# Patient Record
Sex: FEMALE | Race: WHITE | NOT HISPANIC OR LATINO | URBAN - METROPOLITAN AREA
[De-identification: names, ages, dates, MRNs, and addresses within clinical notes are randomized per-mention and may not be internally consistent; named-entity substitution may affect disease eponyms.]

---

## 2022-08-24 DIAGNOSIS — N93.9 ABNORMAL VAGINAL BLEEDING: ICD-10-CM

## 2022-08-24 DIAGNOSIS — D25.9 LEIOMYOMA OF UTERUS, UNSPECIFIED: Primary | ICD-10-CM

## 2022-08-26 DIAGNOSIS — E10.65 TYPE 1 DIABETES MELLITUS WITH HYPERGLYCEMIA: Primary | ICD-10-CM

## 2023-08-28 ENCOUNTER — OFFICE VISIT (OUTPATIENT)
Dept: OPHTHALMOLOGY | Facility: CLINIC | Age: 38
End: 2023-08-28
Payer: MEDICARE

## 2023-08-28 VITALS — WEIGHT: 197 LBS

## 2023-08-28 DIAGNOSIS — H40.022 AT HIGH RISK FOR OPEN ANGLE GLAUCOMA OF LEFT EYE: ICD-10-CM

## 2023-08-28 DIAGNOSIS — H47.239 LARGE PHYSIOLOGIC CUPPING OF OPTIC DISC: ICD-10-CM

## 2023-08-28 DIAGNOSIS — H43.11 VITREOUS HEMORRHAGE, RIGHT: ICD-10-CM

## 2023-08-28 DIAGNOSIS — H25.13 AGE-RELATED NUCLEAR CATARACT OF BOTH EYES: ICD-10-CM

## 2023-08-28 DIAGNOSIS — E11.3521 RIGHT EYE AFFECTED BY PROLIFERATIVE DIABETIC RETINOPATHY WITH TRACTION RETINAL DETACHMENT INVOLVING MACULA, ASSOCIATED WITH TYPE 2 DIABETES MELLITUS: Primary | ICD-10-CM

## 2023-08-28 DIAGNOSIS — E11.3592 PROLIFERATIVE DIABETIC RETINOPATHY OF LEFT EYE WITHOUT MACULAR EDEMA ASSOCIATED WITH TYPE 2 DIABETES MELLITUS: ICD-10-CM

## 2023-08-28 PROCEDURE — 92133 CPTRZD OPH DX IMG PST SGM ON: CPT | Mod: PBBFAC,PN | Performed by: STUDENT IN AN ORGANIZED HEALTH CARE EDUCATION/TRAINING PROGRAM

## 2023-08-28 PROCEDURE — 92134 CPTRZ OPH DX IMG PST SGM RTA: CPT | Mod: PBBFAC,PO | Performed by: OPHTHALMOLOGY

## 2023-08-28 PROCEDURE — 92133 CPTRZD OPH DX IMG PST SGM ON: CPT | Mod: PBBFAC,PO | Performed by: OPHTHALMOLOGY

## 2023-08-28 PROCEDURE — 92134 CPTRZ OPH DX IMG PST SGM RTA: CPT | Mod: PBBFAC,PO | Performed by: STUDENT IN AN ORGANIZED HEALTH CARE EDUCATION/TRAINING PROGRAM

## 2023-08-28 PROCEDURE — 99213 OFFICE O/P EST LOW 20 MIN: CPT | Mod: PBBFAC,PO | Performed by: STUDENT IN AN ORGANIZED HEALTH CARE EDUCATION/TRAINING PROGRAM

## 2023-08-28 PROCEDURE — 92250 FUNDUS PHOTOGRAPHY W/I&R: CPT | Mod: PBBFAC,PO | Performed by: OPHTHALMOLOGY

## 2023-08-28 RX ORDER — INSULIN GLARGINE 100 [IU]/ML
INJECTION, SOLUTION SUBCUTANEOUS
COMMUNITY
Start: 2023-08-06

## 2023-08-28 RX ORDER — NAPROXEN SODIUM 220 MG/1
81 TABLET, FILM COATED ORAL EVERY MORNING
COMMUNITY
Start: 2023-07-12

## 2023-08-28 RX ORDER — SUCROFERRIC OXYHYDROXIDE 500 MG/1
2 TABLET, CHEWABLE ORAL 3 TIMES DAILY
COMMUNITY
Start: 2023-08-08

## 2023-08-28 RX ORDER — ATORVASTATIN CALCIUM 80 MG/1
80 TABLET, FILM COATED ORAL
COMMUNITY
Start: 2023-07-11

## 2023-08-28 RX ORDER — METOCLOPRAMIDE 10 MG/1
10 TABLET ORAL
COMMUNITY

## 2023-08-28 RX ORDER — MEDROXYPROGESTERONE ACETATE 150 MG/ML
INJECTION, SUSPENSION INTRAMUSCULAR
COMMUNITY
Start: 2023-08-16

## 2023-08-28 RX ORDER — INSULIN LISPRO 100 [IU]/ML
INJECTION, SOLUTION INTRAVENOUS; SUBCUTANEOUS
COMMUNITY
Start: 2023-03-28

## 2023-08-28 RX ORDER — ERGOCALCIFEROL 1.25 MG/1
50000 CAPSULE ORAL
COMMUNITY
Start: 2022-10-19

## 2023-08-28 RX ORDER — IRBESARTAN 150 MG/1
150 TABLET ORAL
COMMUNITY
Start: 2021-05-10

## 2023-08-28 RX ORDER — CALCIUM CITRATE/VITAMIN D3 200MG-6.25
TABLET ORAL 3 TIMES DAILY
COMMUNITY
Start: 2023-06-18

## 2023-08-28 RX ORDER — PANTOPRAZOLE SODIUM 40 MG/1
40 TABLET, DELAYED RELEASE ORAL
COMMUNITY
Start: 2023-07-11

## 2023-08-28 RX ORDER — TRAMADOL HYDROCHLORIDE 50 MG/1
50 TABLET ORAL EVERY 6 HOURS PRN
COMMUNITY
Start: 2023-07-21

## 2023-08-28 RX ORDER — TRAZODONE HYDROCHLORIDE 50 MG/1
50 TABLET ORAL NIGHTLY PRN
COMMUNITY
Start: 2023-08-08

## 2023-08-28 RX ORDER — PEN NEEDLE, DIABETIC 31 GX5/16"
NEEDLE, DISPOSABLE MISCELLANEOUS 2 TIMES DAILY
COMMUNITY
Start: 2023-08-06

## 2023-08-28 RX ORDER — PROMETHAZINE HYDROCHLORIDE AND DEXTROMETHORPHAN HYDROBROMIDE 6.25; 15 MG/5ML; MG/5ML
5 SYRUP ORAL EVERY 6 HOURS PRN
COMMUNITY
Start: 2023-04-12

## 2023-08-28 RX ORDER — FUROSEMIDE 80 MG/1
80 TABLET ORAL EVERY MORNING
COMMUNITY
Start: 2023-06-12

## 2023-08-28 RX ORDER — SODIUM BICARBONATE 650 MG/1
1950 TABLET ORAL
COMMUNITY
Start: 2022-10-19

## 2023-08-28 RX ORDER — LORATADINE 10 MG/1
10 TABLET ORAL DAILY PRN
COMMUNITY
Start: 2023-04-11

## 2023-08-28 RX ORDER — CALCITRIOL 0.25 UG/1
0.25 CAPSULE ORAL
COMMUNITY
Start: 2022-09-08

## 2023-08-28 RX ORDER — INSULIN ASPART 100 [IU]/ML
INJECTION, SOLUTION INTRAVENOUS; SUBCUTANEOUS
COMMUNITY
Start: 2023-07-22

## 2023-08-28 RX ORDER — AMLODIPINE BESYLATE 10 MG/1
10 TABLET ORAL
COMMUNITY
Start: 2023-06-16

## 2023-08-28 RX ORDER — LANCETS 33 GAUGE
EACH MISCELLANEOUS
COMMUNITY
Start: 2023-06-20

## 2023-08-28 RX ORDER — SYRINGE,SAFETY WITH NEEDLE,1ML 25GX1"
1 SYRINGE (EA) MISCELLANEOUS 4 TIMES DAILY
COMMUNITY
Start: 2023-08-12

## 2023-08-28 RX ORDER — SEVELAMER CARBONATE 800 MG/1
1600 TABLET, FILM COATED ORAL 3 TIMES DAILY
COMMUNITY
Start: 2023-07-13

## 2023-08-28 RX ORDER — CARVEDILOL 25 MG/1
25 TABLET ORAL 2 TIMES DAILY
COMMUNITY
Start: 2023-05-15

## 2023-08-28 NOTE — PROGRESS NOTES
HPI    ED follow up from 8/27/2023   Patient has been on DM medication for about 20 years but has recently had   to change medications due to insurance issues and due to that her blood   sugar is up and down    She  has been  experiencing eye pressure for many years in both eyes, she   is LP in the right eye with a grey mass in her vision sight   Her eye glasses are about 3 years old   Last edited by Miriam Erazo MA on 8/28/2023 10:15 AM.            OCTmac 8/28/23  OD: TRD, macula involving  OS: Ischemic appearance, no irf/srf, good FC    OCTnerve: 8/28/23  OD: no signal  OS: 87, S yellow, T white, N/I green    Fundus photos 8/28/23  OD: TRD, macula involving  OS: hazy, MA, DBH, no NVD     A/P:    TRD R eye  2. PDR, active, R eye  3. VH R eye  - poorly controlled DM2, on HD d/t DM2 related CKD, felt right eye vision decline around 07/2023 with flashes/floaters  - A1c unknown  - No lasers  - no injections  8/28/23: mac off TRD, d/w pt need to see [facility] if desires to improve vision, patient will be able to arrange a ride there. Will place referral and contact clinic for f/u. RTC Mikey post [facility].      4. ?PDR, active, with DME L eye  - no lasers  - no injections  8/28/23: ? NVE focused temporally distal arcades, no DME. Will likely need PRP after [facility] eval R eye    5. Monocular status  - polycarbs, Mrx  given 828/23    6. NSC OU  - likely nearing VS, however will delay tx until acute issues stabilized    7. Optic disc cupping OS>OD  - OCTn with ?thinning OS  - IOP WNL  - Monitor       Refer to [facility], f/u PRN in [place]

## 2023-08-29 PROBLEM — E11.3592 PROLIFERATIVE DIABETIC RETINOPATHY OF LEFT EYE WITHOUT MACULAR EDEMA ASSOCIATED WITH TYPE 2 DIABETES MELLITUS: Status: ACTIVE | Noted: 2023-08-29

## 2023-09-15 ENCOUNTER — TELEPHONE (OUTPATIENT)
Dept: OPHTHALMOLOGY | Facility: CLINIC | Age: 38
End: 2023-09-15
Payer: MEDICARE

## 2023-09-15 ENCOUNTER — OFFICE VISIT (OUTPATIENT)
Dept: OPHTHALMOLOGY | Facility: CLINIC | Age: 38
End: 2023-09-15
Payer: MEDICARE

## 2023-09-15 VITALS — BODY MASS INDEX: 36.26 KG/M2 | HEIGHT: 62 IN | WEIGHT: 197.06 LBS

## 2023-09-15 DIAGNOSIS — E11.3592 PROLIFERATIVE DIABETIC RETINOPATHY OF LEFT EYE WITHOUT MACULAR EDEMA ASSOCIATED WITH TYPE 2 DIABETES MELLITUS: Primary | ICD-10-CM

## 2023-09-15 DIAGNOSIS — H25.13 AGE-RELATED NUCLEAR CATARACT OF BOTH EYES: ICD-10-CM

## 2023-09-15 DIAGNOSIS — H43.11 VITREOUS HEMORRHAGE, RIGHT: ICD-10-CM

## 2023-09-15 DIAGNOSIS — E10.3512 PROLIFERATIVE DIABETIC RETINOPATHY OF LEFT EYE WITH MACULAR EDEMA ASSOCIATED WITH TYPE 1 DIABETES MELLITUS: ICD-10-CM

## 2023-09-15 DIAGNOSIS — H43.12 VITREOUS HEMORRHAGE, LEFT: ICD-10-CM

## 2023-09-15 PROCEDURE — 99213 OFFICE O/P EST LOW 20 MIN: CPT | Mod: PBBFAC,PN | Performed by: STUDENT IN AN ORGANIZED HEALTH CARE EDUCATION/TRAINING PROGRAM

## 2023-09-15 PROCEDURE — 92134 CPTRZ OPH DX IMG PST SGM RTA: CPT | Mod: PBBFAC,PO | Performed by: STUDENT IN AN ORGANIZED HEALTH CARE EDUCATION/TRAINING PROGRAM

## 2023-09-15 PROCEDURE — 92250 FUNDUS PHOTOGRAPHY W/I&R: CPT | Mod: PBBFAC,PO | Performed by: OPHTHALMOLOGY

## 2023-09-15 RX ORDER — PHENYLEPH/TROPICAMIDE IN WATER 2.5 %-1 %
1 DROPS OPHTHALMIC (EYE) ONCE
Status: COMPLETED | OUTPATIENT
Start: 2023-09-15 | End: 2023-09-15

## 2023-09-15 RX ADMIN — Medication 1 DROP: at 12:09

## 2023-09-15 NOTE — TELEPHONE ENCOUNTER
Called and spoke to pt who states vision OS is very blurry suddenly. States all she sees is red. Can't read or see anything up close. Per Dr. South, pt to be seen today. Added on to schedule. Pt coming now.

## 2023-09-15 NOTE — PROGRESS NOTES
HPI     Decreased Visual Acuity     Additional comments: Patient states that when she woke up this morning   she saw red blob in her vision OD and vision OD was decreased            Comments    Decreased Visual Acuity          Last edited by Princess Jama MA on 9/15/2023 12:15 PM.            Assessment /Plan       HPI     Decreased Visual Acuity     Additional comments: Patient states that when she woke up this morning   she saw red blob in her vision OD and vision OD was decreased            Comments    Decreased Visual Acuity          Last edited by Princess Jama MA on 9/15/2023 12:15 PM.        OCTmac 9/15/23  OD: TRD, mac off  OS: dense fresh hyperechoic material subhyaloid, macula beneath appears flat    OCTmac 8/28/23  OD: TRD, macula involving  OS: Ischemic appearance, no irf/srf, good FC    OCTnerve: 8/28/23  OD: no signal  OS: 87, S yellow, T white, N/I green    Fundus photos 8/28/23  OD: TRD, macula involving  OS: hazy, MA, DBH, no NVD     A/P:    TRD/RRD R eye  2. T1DM w/PDR, active, R eye  3. VH R eye  - poorly controlled DM2, on HD d/t DM2 related CKD, felt right eye vision decline around 07/2023 with flashes/floaters  - A1c unknown  - No lasers  - no injections  8/28/23: mac off TRD, d/w pt need to see [facility] if desires to improve vision, patient will be able to arrange a ride there  9/15/23: pt scheduled for TRD repair in [place] in October    4. T1DM w/PDR, active, with DME L eye  5. Subhylaoid bleed/VH L eye  - no lasers  - no injections  8/28/23: ? NVE focused temporally distal arcades, no DME. Will likely need PRP after [facility] eval R eye  -9/15/23: pt with acute drop in left eye vision 9/14/23 seeing red floaters, fresh subhyaloid heme on DFE. NO traction. Photos taken.  D/w Dr Medeiros [facility] fellow who suggests PRP followed by [facility] in same day given concern for hazy K and IOP spike. Still desires for right eye TRD surgery first. Will RTC next avail procedure for this plan.     5. Monocular  status  - polycarbs, Mrx  given 828/23    6. NSC OU  - likely nearing VS, however will delay tx until acute issues stabilized    7. Optic disc cupping OS>OD  - OCTn with ?thinning OS  - IOP WNL  - Monitor       Refer to [facility] 10/5 R eye  RTC Eric 1-2 weeks PRP OS followed by [facility] OS same day   Will need glaucoma evaluation

## 2023-09-15 NOTE — TELEPHONE ENCOUNTER
----- Message from Gail Watson sent at 9/15/2023  9:04 AM CDT -----  Regarding: Sudden vision change  Contact: Pamela  Patient called to say her left eye vision has changed,everything is blurry.  Requesting to speak with provider to see what to she needs to do.     Confirmed no drops being used    Please give her a call. [phone]      Thanks

## 2023-09-25 ENCOUNTER — TELEPHONE (OUTPATIENT)
Dept: OPHTHALMOLOGY | Facility: CLINIC | Age: 38
End: 2023-09-25
Payer: MEDICARE

## 2023-09-25 NOTE — TELEPHONE ENCOUNTER
Pre-procedure call. Spoke with pt who has no questions regarding upcoming procedure. Reminded of appt time. Pt confirms and states will be present. Instructed pt to call if unable to come.

## 2023-09-27 ENCOUNTER — CLINICAL SUPPORT (OUTPATIENT)
Dept: OPHTHALMOLOGY | Facility: CLINIC | Age: 38
End: 2023-09-27
Payer: MEDICARE

## 2023-09-27 VITALS — WEIGHT: 197.06 LBS | HEIGHT: 62 IN | BODY MASS INDEX: 36.26 KG/M2

## 2023-09-27 DIAGNOSIS — E10.3512 PROLIFERATIVE DIABETIC RETINOPATHY OF LEFT EYE WITH MACULAR EDEMA ASSOCIATED WITH TYPE 1 DIABETES MELLITUS: ICD-10-CM

## 2023-09-27 DIAGNOSIS — E11.3521 RIGHT EYE AFFECTED BY PROLIFERATIVE DIABETIC RETINOPATHY WITH TRACTION RETINAL DETACHMENT INVOLVING MACULA, ASSOCIATED WITH TYPE 2 DIABETES MELLITUS: Primary | ICD-10-CM

## 2023-09-27 PROCEDURE — 92134 CPTRZ OPH DX IMG PST SGM RTA: CPT | Mod: PBBFAC,PN | Performed by: OPHTHALMOLOGY

## 2023-09-27 PROCEDURE — 92134 CPTRZ OPH DX IMG PST SGM RTA: CPT | Mod: PBBFAC,PN | Performed by: STUDENT IN AN ORGANIZED HEALTH CARE EDUCATION/TRAINING PROGRAM

## 2023-09-27 PROCEDURE — 99212 OFFICE O/P EST SF 10 MIN: CPT | Mod: PBBFAC,PN | Performed by: STUDENT IN AN ORGANIZED HEALTH CARE EDUCATION/TRAINING PROGRAM

## 2023-09-27 PROCEDURE — 67228 TREATMENT X10SV RETINOPATHY: CPT | Mod: PBBFAC,PN | Performed by: STUDENT IN AN ORGANIZED HEALTH CARE EDUCATION/TRAINING PROGRAM

## 2023-09-27 RX ORDER — MULTIVITAMIN
1 TABLET ORAL
COMMUNITY

## 2023-09-27 NOTE — PROGRESS NOTES
HPI     Proliferative diabetic retinopathy      Additional comments: PRP OS followed by [facility] OS same day            Comments    T1DM w/PDR, active, with DME Left eye          Last edited by Princess Jama MA on 9/27/2023  9:08 AM.            HPI     Proliferative diabetic retinopathy      Additional comments: PRP OS followed by [facility] OS same day            Comments    T1DM w/PDR, active, with DME Left eye          Last edited by Princess Jama MA on 9/27/2023  9:08 AM.            Assessment /Plan       HPI     Proliferative diabetic retinopathy      Additional comments: PRP OS followed by [facility] OS same day            Comments    T1DM w/PDR, active, with DME Left eye          Last edited by Princess Jama MA on 9/27/2023  9:08 AM.        OCTmac 9/27/23  OD: large SRF, TRD  OS: central VMT with distorted FC, no irf/srf    OCTmac 9/15/23  OD: TRD, mac off  OS: dense fresh hyperechoic material subhyaloid, macula beneath appears flat    OCTmac 8/28/23  OD: TRD, macula involving  OS: Ischemic appearance, no irf/srf, good FC    OCTnerve: 8/28/23  OD: no signal  OS: 87, S yellow, T white, N/I green    Fundus photos 8/28/23  OD: TRD, macula involving  OS: hazy, MA, DBH, no NVD     A/P:    TRD/RRD R eye  2. T1DM w/PDR, active, R eye  3. VH R eye  - poorly controlled DM2, on HD d/t DM2 related CKD, felt right eye vision decline around 07/2023 with flashes/floaters  - A1c unknown  - No lasers  - no injections  8/28/23: mac off TRD, d/w pt need to see [facility] if desires to improve vision, patient will be able to arrange a ride there  9/15/23: pt scheduled for TRD repair in [facility] in October 10/5    4. T1DM w/PDR, active, with DME L eye  5. Subhylaoid bleed/VH L eye  6. VMT OS  -  #1 9/27/23: will need more  - no injections  8/28/23: ? NVE focused temporally distal arcades, no DME. Will likely need PRP after [facility] eval R eye  -9/15/23: pt with acute drop in left eye vision 9/14/23 seeing red floaters, fresh subhyaloid  heme on DFE. NO traction. Photos taken.  D/w Dr Medeiros [facility] fellow who suggests PRP followed by [facility] in same day given concern for hazy K and IOP spike. Still desires for right eye TRD surgery first.   - 9/27/23: completed , will need more fill, strong bells with only OK take and not enough shots placed. Deferred R[facility] as well on this day given improved subhyaloid blood and VA, with no high risk PDR fts. Pt to have [facility] surgery and RTC once they are ready. Will need future    5. Monocular status  - polycarbs, Mrx  given 828/23    6. NSC OU  - likely nearing VS, however will delay tx until acute issues stabilized    7. Optic disc cupping OS>OD  - OCTn with ?thinning OS  - IOP WNL  - Monitor       Refer to [facility] 10/5 R eye  RTC Olayinkafayettnat PRN Pt to have [facility] surgery and RTC once they are ready  Will need glaucoma evaluation after    PROCEDURE NOTE:  Procedure: Panretinal photocoagulation, left eye  Indication: Proliferative diabetic retinopathy, left eye  Procedure:   Risks, benefits, and alternatives were discussed.  All questions were answered.  The patient voiced understanding and wished to proceed.  Informed consent was obtained.  A time out was performed to confirm correct patient, procedure, and site.  Topical anesthesia was instilled in the eye to be treated with proparacaine.  Laser details as below:    Laser: Argon    Spot size: 200   Power: 190-200    Duration: 150   Number: [phone]  360 degrees, avoiding subhyaloid heme, strong bells, will need more fill.    Pt tolerated procedure well without complications.  Pt was given a follow up appointment and given anticipatory guidance regarding potential complications

## 2025-03-13 DIAGNOSIS — E11.9 TYPE 2 DIABETES MELLITUS WITHOUT COMPLICATION, UNSPECIFIED WHETHER LONG TERM INSULIN USE: Primary | ICD-10-CM

## 2025-07-24 ENCOUNTER — HOSPITAL ENCOUNTER (INPATIENT)
Facility: HOSPITAL | Age: 40
LOS: 2 days | Discharge: HOME | DRG: 313 | End: 2025-07-26
Attending: EMERGENCY MEDICINE | Admitting: INTERNAL MEDICINE
Payer: COMMERCIAL

## 2025-07-24 DIAGNOSIS — R07.9 CHEST PAIN: ICD-10-CM

## 2025-07-24 LAB
ALBUMIN SERPL-MCNC: 3.6 G/DL (ref 3.5–5)
ALBUMIN/GLOB SERPL: 0.7 RATIO (ref 1.1–2)
ALP SERPL-CCNC: 213 UNIT/L (ref 40–150)
ALT SERPL-CCNC: 13 UNIT/L (ref 0–55)
ANION GAP SERPL CALC-SCNC: 13 MEQ/L
AST SERPL-CCNC: 15 UNIT/L (ref 11–45)
BASOPHILS # BLD AUTO: 0.04 X10(3)/MCL
BASOPHILS NFR BLD AUTO: 0.7 %
BILIRUB SERPL-MCNC: 1 MG/DL
BUN SERPL-MCNC: 22.1 MG/DL (ref 7–18.7)
CALCIUM SERPL-MCNC: 9.2 MG/DL (ref 8.4–10.2)
CHLORIDE SERPL-SCNC: 93 MMOL/L (ref 98–107)
CO2 SERPL-SCNC: 32 MMOL/L (ref 22–29)
CREAT SERPL-MCNC: 4.29 MG/DL (ref 0.55–1.02)
CREAT/UREA NIT SERPL: 5
D DIMER PPP IA.FEU-MCNC: 0.39 UG/ML FEU (ref 0–0.5)
EOSINOPHIL # BLD AUTO: 0.11 X10(3)/MCL (ref 0–0.9)
EOSINOPHIL NFR BLD AUTO: 1.8 %
ERYTHROCYTE [DISTWIDTH] IN BLOOD BY AUTOMATED COUNT: 14.7 % (ref 11.5–17)
FLUAV AG UPPER RESP QL IA.RAPID: NOT DETECTED
FLUBV AG UPPER RESP QL IA.RAPID: NOT DETECTED
GFR SERPLBLD CREATININE-BSD FMLA CKD-EPI: 13 ML/MIN/1.73/M2
GLOBULIN SER-MCNC: 5.1 GM/DL (ref 2.4–3.5)
GLUCOSE SERPL-MCNC: 180 MG/DL (ref 74–100)
HCT VFR BLD AUTO: 34.1 % (ref 37–47)
HGB BLD-MCNC: 11.1 G/DL (ref 12–16)
IMM GRANULOCYTES # BLD AUTO: 0.03 X10(3)/MCL (ref 0–0.04)
IMM GRANULOCYTES NFR BLD AUTO: 0.5 %
INR PPP: 1.1
LYMPHOCYTES # BLD AUTO: 1.21 X10(3)/MCL (ref 0.6–4.6)
LYMPHOCYTES NFR BLD AUTO: 19.9 %
MAGNESIUM SERPL-MCNC: 2 MG/DL (ref 1.6–2.6)
MCH RBC QN AUTO: 29.2 PG (ref 27–31)
MCHC RBC AUTO-ENTMCNC: 32.6 G/DL (ref 33–36)
MCV RBC AUTO: 89.7 FL (ref 80–94)
MONOCYTES # BLD AUTO: 0.34 X10(3)/MCL (ref 0.1–1.3)
MONOCYTES NFR BLD AUTO: 5.6 %
NEUTROPHILS # BLD AUTO: 4.35 X10(3)/MCL (ref 2.1–9.2)
NEUTROPHILS NFR BLD AUTO: 71.5 %
NRBC BLD AUTO-RTO: 0 %
PLATELET # BLD AUTO: 237 X10(3)/MCL (ref 130–400)
PMV BLD AUTO: 10.4 FL (ref 7.4–10.4)
POTASSIUM SERPL-SCNC: 2.9 MMOL/L (ref 3.5–5.1)
PROT SERPL-MCNC: 8.7 GM/DL (ref 6.4–8.3)
PROTHROMBIN TIME: 14.5 SECONDS (ref 12.5–14.5)
RBC # BLD AUTO: 3.8 X10(6)/MCL (ref 4.2–5.4)
SARS-COV-2 RNA RESP QL NAA+PROBE: NOT DETECTED
SODIUM SERPL-SCNC: 138 MMOL/L (ref 136–145)
TROPONIN I SERPL HS-MCNC: 11 NG/L
TROPONIN I SERPL HS-MCNC: 13 NG/L
WBC # BLD AUTO: 6.08 X10(3)/MCL (ref 4.5–11.5)

## 2025-07-24 PROCEDURE — 96375 TX/PRO/DX INJ NEW DRUG ADDON: CPT

## 2025-07-24 PROCEDURE — 11000001 HC ACUTE MED/SURG PRIVATE ROOM

## 2025-07-24 PROCEDURE — 93010 ELECTROCARDIOGRAM REPORT: CPT | Mod: ,,, | Performed by: INTERNAL MEDICINE

## 2025-07-24 PROCEDURE — 85610 PROTHROMBIN TIME: CPT

## 2025-07-24 PROCEDURE — 85379 FIBRIN DEGRADATION QUANT: CPT | Performed by: EMERGENCY MEDICINE

## 2025-07-24 PROCEDURE — 84484 ASSAY OF TROPONIN QUANT: CPT

## 2025-07-24 PROCEDURE — 80053 COMPREHEN METABOLIC PANEL: CPT

## 2025-07-24 PROCEDURE — 99285 EMERGENCY DEPT VISIT HI MDM: CPT | Mod: 25

## 2025-07-24 PROCEDURE — 96374 THER/PROPH/DIAG INJ IV PUSH: CPT

## 2025-07-24 PROCEDURE — 63600175 PHARM REV CODE 636 W HCPCS: Performed by: EMERGENCY MEDICINE

## 2025-07-24 PROCEDURE — 25000003 PHARM REV CODE 250: Performed by: EMERGENCY MEDICINE

## 2025-07-24 PROCEDURE — 85025 COMPLETE CBC W/AUTO DIFF WBC: CPT

## 2025-07-24 PROCEDURE — 83735 ASSAY OF MAGNESIUM: CPT | Performed by: EMERGENCY MEDICINE

## 2025-07-24 PROCEDURE — 63600175 PHARM REV CODE 636 W HCPCS: Performed by: NURSE PRACTITIONER

## 2025-07-24 PROCEDURE — 87636 SARSCOV2 & INF A&B AMP PRB: CPT | Performed by: EMERGENCY MEDICINE

## 2025-07-24 RX ORDER — ALUMINUM HYDROXIDE, MAGNESIUM HYDROXIDE, AND SIMETHICONE 1200; 120; 1200 MG/30ML; MG/30ML; MG/30ML
30 SUSPENSION ORAL 4 TIMES DAILY PRN
Status: DISCONTINUED | OUTPATIENT
Start: 2025-07-25 | End: 2025-07-26 | Stop reason: HOSPADM

## 2025-07-24 RX ORDER — ASPIRIN 325 MG
325 TABLET, DELAYED RELEASE (ENTERIC COATED) ORAL
Status: COMPLETED | OUTPATIENT
Start: 2025-07-24 | End: 2025-07-24

## 2025-07-24 RX ORDER — IBUPROFEN 200 MG
24 TABLET ORAL
Status: DISCONTINUED | OUTPATIENT
Start: 2025-07-25 | End: 2025-07-26 | Stop reason: HOSPADM

## 2025-07-24 RX ORDER — NALOXONE HCL 0.4 MG/ML
0.02 VIAL (ML) INJECTION
Status: DISCONTINUED | OUTPATIENT
Start: 2025-07-25 | End: 2025-07-26 | Stop reason: HOSPADM

## 2025-07-24 RX ORDER — BISACODYL 10 MG/1
10 SUPPOSITORY RECTAL DAILY PRN
Status: DISCONTINUED | OUTPATIENT
Start: 2025-07-25 | End: 2025-07-26 | Stop reason: HOSPADM

## 2025-07-24 RX ORDER — ACETAMINOPHEN 500 MG
1000 TABLET ORAL EVERY 6 HOURS PRN
Status: DISCONTINUED | OUTPATIENT
Start: 2025-07-25 | End: 2025-07-26 | Stop reason: HOSPADM

## 2025-07-24 RX ORDER — DIPHENHYDRAMINE HYDROCHLORIDE 50 MG/ML
25 INJECTION, SOLUTION INTRAMUSCULAR; INTRAVENOUS
Status: COMPLETED | OUTPATIENT
Start: 2025-07-24 | End: 2025-07-24

## 2025-07-24 RX ORDER — GABAPENTIN 300 MG/1
300 CAPSULE ORAL
Status: COMPLETED | OUTPATIENT
Start: 2025-07-24 | End: 2025-07-24

## 2025-07-24 RX ORDER — IBUPROFEN 200 MG
16 TABLET ORAL
Status: DISCONTINUED | OUTPATIENT
Start: 2025-07-25 | End: 2025-07-26 | Stop reason: HOSPADM

## 2025-07-24 RX ORDER — GLUCAGON 1 MG
1 KIT INJECTION
Status: DISCONTINUED | OUTPATIENT
Start: 2025-07-24 | End: 2025-07-25

## 2025-07-24 RX ORDER — INSULIN ASPART 100 [IU]/ML
0-10 INJECTION, SOLUTION INTRAVENOUS; SUBCUTANEOUS
Status: DISCONTINUED | OUTPATIENT
Start: 2025-07-24 | End: 2025-07-25

## 2025-07-24 RX ORDER — PROCHLORPERAZINE EDISYLATE 5 MG/ML
10 INJECTION INTRAMUSCULAR; INTRAVENOUS
Status: COMPLETED | OUTPATIENT
Start: 2025-07-24 | End: 2025-07-24

## 2025-07-24 RX ORDER — SODIUM CHLORIDE 0.9 % (FLUSH) 0.9 %
10 SYRINGE (ML) INJECTION
Status: DISCONTINUED | OUTPATIENT
Start: 2025-07-25 | End: 2025-07-26 | Stop reason: HOSPADM

## 2025-07-24 RX ORDER — IBUPROFEN 200 MG
16 TABLET ORAL
Status: DISCONTINUED | OUTPATIENT
Start: 2025-07-24 | End: 2025-07-25

## 2025-07-24 RX ORDER — POTASSIUM CHLORIDE 7.45 MG/ML
10 INJECTION INTRAVENOUS
Status: COMPLETED | OUTPATIENT
Start: 2025-07-24 | End: 2025-07-24

## 2025-07-24 RX ORDER — IBUPROFEN 200 MG
24 TABLET ORAL
Status: DISCONTINUED | OUTPATIENT
Start: 2025-07-24 | End: 2025-07-25

## 2025-07-24 RX ORDER — ENOXAPARIN SODIUM 100 MG/ML
30 INJECTION SUBCUTANEOUS EVERY 24 HOURS
Status: DISCONTINUED | OUTPATIENT
Start: 2025-07-25 | End: 2025-07-26 | Stop reason: HOSPADM

## 2025-07-24 RX ORDER — GLUCAGON 1 MG
1 KIT INJECTION
Status: DISCONTINUED | OUTPATIENT
Start: 2025-07-25 | End: 2025-07-26 | Stop reason: HOSPADM

## 2025-07-24 RX ADMIN — DIPHENHYDRAMINE HYDROCHLORIDE 25 MG: 50 INJECTION INTRAMUSCULAR; INTRAVENOUS at 08:07

## 2025-07-24 RX ADMIN — GABAPENTIN 300 MG: 300 CAPSULE ORAL at 10:07

## 2025-07-24 RX ADMIN — POTASSIUM CHLORIDE 10 MEQ: 7.46 INJECTION, SOLUTION INTRAVENOUS at 09:07

## 2025-07-24 RX ADMIN — INSULIN ASPART 2 UNITS: 100 INJECTION, SOLUTION INTRAVENOUS; SUBCUTANEOUS at 11:07

## 2025-07-24 RX ADMIN — POTASSIUM CHLORIDE 10 MEQ: 7.46 INJECTION, SOLUTION INTRAVENOUS at 11:07

## 2025-07-24 RX ADMIN — PROCHLORPERAZINE EDISYLATE 10 MG: 5 INJECTION INTRAMUSCULAR; INTRAVENOUS at 08:07

## 2025-07-24 RX ADMIN — ASPIRIN 325 MG: 325 TABLET, COATED ORAL at 09:07

## 2025-07-24 NOTE — FIRST PROVIDER EVALUATION
"Medical screening examination initiated.  I have conducted a focused provider triage encounter, findings are as follows:    Brief history of present illness:  40-year-old female presents to the emergency department with complaints of left-sided chest pain that started about 1 hour ago.  Denies shortness of breath.  Patient received 324 of aspirin, 2 sprays of nitro with EMS.  Denies shortness of breath    Vitals:    07/24/25 1525   BP: (!) 175/69   Pulse: 71   Resp: 20   Temp: 98 °F (36.7 °C)   TempSrc: Oral   SpO2: 97%   Weight: 81.6 kg (180 lb)   Height: 5' 3" (1.6 m)       Pertinent physical exam:  Awake and alert, NAD    Brief workup plan:  Labs, EKG, chest x-ray    Preliminary workup initiated; this workup will be continued and followed by the physician or advanced practice provider that is assigned to the patient when roomed.  "
yes...

## 2025-07-25 LAB
ALBUMIN SERPL-MCNC: 3.3 G/DL (ref 3.5–5)
ALBUMIN/GLOB SERPL: 0.7 RATIO (ref 1.1–2)
ALP SERPL-CCNC: 184 UNIT/L (ref 40–150)
ALT SERPL-CCNC: 12 UNIT/L (ref 0–55)
ANION GAP SERPL CALC-SCNC: 16 MEQ/L
AST SERPL-CCNC: 15 UNIT/L (ref 11–45)
AV INDEX (PROSTH): 0.85
AV MEAN GRADIENT: 6 MMHG
AV PEAK GRADIENT: 12 MMHG
AV VALVE AREA BY VELOCITY RATIO: 2.1 CM²
AV VALVE AREA: 1.9 CM²
AV VELOCITY RATIO: 0.94
BASOPHILS # BLD AUTO: 0.04 X10(3)/MCL
BASOPHILS NFR BLD AUTO: 0.7 %
BILIRUB SERPL-MCNC: 0.9 MG/DL
BSA FOR ECHO PROCEDURE: 1.89 M2
BUN SERPL-MCNC: 29.8 MG/DL (ref 7–18.7)
CALCIUM SERPL-MCNC: 9 MG/DL (ref 8.4–10.2)
CHLORIDE SERPL-SCNC: 95 MMOL/L (ref 98–107)
CO2 SERPL-SCNC: 26 MMOL/L (ref 22–29)
CREAT SERPL-MCNC: 5.77 MG/DL (ref 0.55–1.02)
CREAT/UREA NIT SERPL: 5
DOP CALC AO PEAK VEL: 1.7 M/S
DOP CALC AO VTI: 38 CM
DOP CALC LVOT AREA: 2.3 CM2
DOP CALC LVOT DIAMETER: 1.7 CM
DOP CALC LVOT PEAK VEL: 1.6 M/S
DOP CALC LVOT STROKE VOLUME: 73.5 CM3
DOP CALC MV VTI: 41.6 CM
DOP CALCLVOT PEAK VEL VTI: 32.4 CM
E WAVE DECELERATION TIME: 190 MSEC
E/A RATIO: 2.13
E/E' RATIO: 19 M/S
EOSINOPHIL # BLD AUTO: 0.11 X10(3)/MCL (ref 0–0.9)
EOSINOPHIL NFR BLD AUTO: 2 %
ERYTHROCYTE [DISTWIDTH] IN BLOOD BY AUTOMATED COUNT: 14.7 % (ref 11.5–17)
GFR SERPLBLD CREATININE-BSD FMLA CKD-EPI: 9 ML/MIN/1.73/M2
GLOBULIN SER-MCNC: 4.5 GM/DL (ref 2.4–3.5)
GLUCOSE SERPL-MCNC: 174 MG/DL (ref 74–100)
HCT VFR BLD AUTO: 34.9 % (ref 37–47)
HGB BLD-MCNC: 11.2 G/DL (ref 12–16)
IMM GRANULOCYTES # BLD AUTO: 0.03 X10(3)/MCL (ref 0–0.04)
IMM GRANULOCYTES NFR BLD AUTO: 0.6 %
LEFT ATRIUM AREA SYSTOLIC (APICAL 2 CHAMBER): 17.3 CM2
LEFT ATRIUM AREA SYSTOLIC (APICAL 4 CHAMBER): 19.7 CM2
LEFT ATRIUM VOLUME INDEX MOD: 30 ML/M2
LEFT ATRIUM VOLUME MOD: 56 ML
LEFT VENTRICLE END SYSTOLIC VOLUME APICAL 2 CHAMBER: 49.1 ML
LEFT VENTRICLE END SYSTOLIC VOLUME APICAL 4 CHAMBER: 53.4 ML
LV LATERAL E/E' RATIO: 16 M/S
LV SEPTAL E/E' RATIO: 22.9 M/S
LVOT MG: 5 MMHG
LVOT MV: 0.95 CM/S
LYMPHOCYTES # BLD AUTO: 1 X10(3)/MCL (ref 0.6–4.6)
LYMPHOCYTES NFR BLD AUTO: 18.5 %
MAGNESIUM SERPL-MCNC: 2.2 MG/DL (ref 1.6–2.6)
MAGNESIUM SERPL-MCNC: 2.3 MG/DL (ref 1.6–2.6)
MCH RBC QN AUTO: 28.5 PG (ref 27–31)
MCHC RBC AUTO-ENTMCNC: 32.1 G/DL (ref 33–36)
MCV RBC AUTO: 88.8 FL (ref 80–94)
MONOCYTES # BLD AUTO: 0.35 X10(3)/MCL (ref 0.1–1.3)
MONOCYTES NFR BLD AUTO: 6.5 %
MV MEAN GRADIENT: 5 MMHG
MV PEAK A VEL: 0.75 M/S
MV PEAK E VEL: 1.6 M/S
MV PEAK GRADIENT: 13 MMHG
MV VALVE AREA BY CONTINUITY EQUATION: 1.77 CM2
NEUTROPHILS # BLD AUTO: 3.87 X10(3)/MCL (ref 2.1–9.2)
NEUTROPHILS NFR BLD AUTO: 71.7 %
NRBC BLD AUTO-RTO: 0 %
OHS CV CPX PATIENT HEIGHT IN: 65
OHS QRS DURATION: 90 MS
OHS QTC CALCULATION: 515 MS
PISA TR MAX VEL: 3 M/S
PLATELET # BLD AUTO: 207 X10(3)/MCL (ref 130–400)
PMV BLD AUTO: 9.9 FL (ref 7.4–10.4)
POCT GLUCOSE: 248 MG/DL (ref 70–110)
POCT GLUCOSE: 299 MG/DL (ref 70–110)
POTASSIUM SERPL-SCNC: 3.6 MMOL/L (ref 3.5–5.1)
PROT SERPL-MCNC: 7.8 GM/DL (ref 6.4–8.3)
RA PRESSURE ESTIMATED: 15 MMHG
RBC # BLD AUTO: 3.93 X10(6)/MCL (ref 4.2–5.4)
RV TB RVSP: 18 MMHG
SODIUM SERPL-SCNC: 137 MMOL/L (ref 136–145)
TDI LATERAL: 0.1 M/S
TDI SEPTAL: 0.07 M/S
TDI: 0.09 M/S
TR MAX PG: 35 MMHG
TRICUSPID ANNULAR PLANE SYSTOLIC EXCURSION: 2 CM
TROPONIN I SERPL HS-MCNC: 12 NG/L
TV REST PULMONARY ARTERY PRESSURE: 51 MMHG
WBC # BLD AUTO: 5.4 X10(3)/MCL (ref 4.5–11.5)

## 2025-07-25 PROCEDURE — 83735 ASSAY OF MAGNESIUM: CPT | Performed by: INTERNAL MEDICINE

## 2025-07-25 PROCEDURE — 36415 COLL VENOUS BLD VENIPUNCTURE: CPT

## 2025-07-25 PROCEDURE — 36415 COLL VENOUS BLD VENIPUNCTURE: CPT | Performed by: INTERNAL MEDICINE

## 2025-07-25 PROCEDURE — 63600175 PHARM REV CODE 636 W HCPCS: Performed by: INTERNAL MEDICINE

## 2025-07-25 PROCEDURE — 84484 ASSAY OF TROPONIN QUANT: CPT

## 2025-07-25 PROCEDURE — 25000003 PHARM REV CODE 250: Performed by: NURSE PRACTITIONER

## 2025-07-25 PROCEDURE — 63600175 PHARM REV CODE 636 W HCPCS: Performed by: NURSE PRACTITIONER

## 2025-07-25 PROCEDURE — 80053 COMPREHEN METABOLIC PANEL: CPT

## 2025-07-25 PROCEDURE — 85025 COMPLETE CBC W/AUTO DIFF WBC: CPT

## 2025-07-25 PROCEDURE — 21400001 HC TELEMETRY ROOM

## 2025-07-25 PROCEDURE — 25000003 PHARM REV CODE 250: Performed by: INTERNAL MEDICINE

## 2025-07-25 PROCEDURE — 83735 ASSAY OF MAGNESIUM: CPT

## 2025-07-25 PROCEDURE — A4216 STERILE WATER/SALINE, 10 ML: HCPCS

## 2025-07-25 PROCEDURE — 25000003 PHARM REV CODE 250

## 2025-07-25 PROCEDURE — 63600175 PHARM REV CODE 636 W HCPCS

## 2025-07-25 RX ORDER — CARVEDILOL 12.5 MG/1
25 TABLET ORAL 2 TIMES DAILY
Status: DISCONTINUED | OUTPATIENT
Start: 2025-07-25 | End: 2025-07-26 | Stop reason: HOSPADM

## 2025-07-25 RX ORDER — AMLODIPINE BESYLATE 5 MG/1
10 TABLET ORAL DAILY
Status: DISCONTINUED | OUTPATIENT
Start: 2025-07-25 | End: 2025-07-26

## 2025-07-25 RX ORDER — ERGOCALCIFEROL 1.25 MG/1
50000 CAPSULE ORAL
Status: DISCONTINUED | OUTPATIENT
Start: 2025-07-25 | End: 2025-07-26 | Stop reason: HOSPADM

## 2025-07-25 RX ORDER — DOXYLAMINE SUCCINATE 25 MG
TABLET ORAL 2 TIMES DAILY
Status: DISCONTINUED | OUTPATIENT
Start: 2025-07-25 | End: 2025-07-26 | Stop reason: HOSPADM

## 2025-07-25 RX ORDER — TICAGRELOR 90 MG/1
90 TABLET, FILM COATED ORAL 2 TIMES DAILY
Status: DISCONTINUED | OUTPATIENT
Start: 2025-07-25 | End: 2025-07-26 | Stop reason: HOSPADM

## 2025-07-25 RX ORDER — DIPHENHYDRAMINE HCL 25 MG
50 CAPSULE ORAL NIGHTLY PRN
Status: DISCONTINUED | OUTPATIENT
Start: 2025-07-26 | End: 2025-07-26 | Stop reason: HOSPADM

## 2025-07-25 RX ORDER — LOSARTAN POTASSIUM 50 MG/1
100 TABLET ORAL DAILY
Status: DISCONTINUED | OUTPATIENT
Start: 2025-07-25 | End: 2025-07-26 | Stop reason: HOSPADM

## 2025-07-25 RX ORDER — ATORVASTATIN CALCIUM 40 MG/1
80 TABLET, FILM COATED ORAL DAILY
Status: DISCONTINUED | OUTPATIENT
Start: 2025-07-25 | End: 2025-07-26 | Stop reason: HOSPADM

## 2025-07-25 RX ORDER — SEVELAMER CARBONATE 800 MG/1
1600 TABLET, FILM COATED ORAL 3 TIMES DAILY
Status: DISCONTINUED | OUTPATIENT
Start: 2025-07-25 | End: 2025-07-26 | Stop reason: HOSPADM

## 2025-07-25 RX ORDER — INSULIN GLARGINE 100 [IU]/ML
18 INJECTION, SOLUTION SUBCUTANEOUS DAILY
Status: DISCONTINUED | OUTPATIENT
Start: 2025-07-26 | End: 2025-07-26 | Stop reason: HOSPADM

## 2025-07-25 RX ORDER — SODIUM BICARBONATE 650 MG/1
650 TABLET ORAL DAILY
Status: DISCONTINUED | OUTPATIENT
Start: 2025-07-25 | End: 2025-07-26 | Stop reason: HOSPADM

## 2025-07-25 RX ORDER — INSULIN GLARGINE 100 [IU]/ML
14 INJECTION, SOLUTION SUBCUTANEOUS DAILY
Status: DISCONTINUED | OUTPATIENT
Start: 2025-07-25 | End: 2025-07-25

## 2025-07-25 RX ORDER — PANTOPRAZOLE SODIUM 40 MG/1
40 TABLET, DELAYED RELEASE ORAL DAILY
Status: DISCONTINUED | OUTPATIENT
Start: 2025-07-25 | End: 2025-07-26 | Stop reason: HOSPADM

## 2025-07-25 RX ORDER — NAPROXEN SODIUM 220 MG/1
81 TABLET, FILM COATED ORAL EVERY MORNING
Status: DISCONTINUED | OUTPATIENT
Start: 2025-07-25 | End: 2025-07-26 | Stop reason: HOSPADM

## 2025-07-25 RX ORDER — CALCITRIOL 0.25 UG/1
0.25 CAPSULE ORAL DAILY
Status: DISCONTINUED | OUTPATIENT
Start: 2025-07-25 | End: 2025-07-26 | Stop reason: HOSPADM

## 2025-07-25 RX ORDER — FUROSEMIDE 40 MG/1
80 TABLET ORAL EVERY MORNING
Status: DISCONTINUED | OUTPATIENT
Start: 2025-07-25 | End: 2025-07-26 | Stop reason: HOSPADM

## 2025-07-25 RX ORDER — CEFTRIAXONE 1 G/1
1 INJECTION, POWDER, FOR SOLUTION INTRAMUSCULAR; INTRAVENOUS
Status: DISCONTINUED | OUTPATIENT
Start: 2025-07-25 | End: 2025-07-26 | Stop reason: HOSPADM

## 2025-07-25 RX ADMIN — CALCITRIOL CAPSULES 0.25 MCG 0.25 MCG: 0.25 CAPSULE ORAL at 10:07

## 2025-07-25 RX ADMIN — ATORVASTATIN CALCIUM 80 MG: 40 TABLET, FILM COATED ORAL at 10:07

## 2025-07-25 RX ADMIN — INSULIN ASPART 3 UNITS: 100 INJECTION, SOLUTION INTRAVENOUS; SUBCUTANEOUS at 09:07

## 2025-07-25 RX ADMIN — LOSARTAN POTASSIUM 100 MG: 50 TABLET, FILM COATED ORAL at 10:07

## 2025-07-25 RX ADMIN — INSULIN GLARGINE 14 UNITS: 100 INJECTION, SOLUTION SUBCUTANEOUS at 10:07

## 2025-07-25 RX ADMIN — CARVEDILOL 25 MG: 12.5 TABLET, FILM COATED ORAL at 10:07

## 2025-07-25 RX ADMIN — SODIUM BICARBONATE 650 MG TABLET 650 MG: at 10:07

## 2025-07-25 RX ADMIN — TICAGRELOR 90 MG: 90 TABLET ORAL at 09:07

## 2025-07-25 RX ADMIN — AMLODIPINE BESYLATE 10 MG: 5 TABLET ORAL at 10:07

## 2025-07-25 RX ADMIN — TICAGRELOR 90 MG: 90 TABLET ORAL at 10:07

## 2025-07-25 RX ADMIN — SEVELAMER CARBONATE 1600 MG: 800 TABLET, FILM COATED ORAL at 03:07

## 2025-07-25 RX ADMIN — SODIUM CHLORIDE 10 ML: 9 INJECTION, SOLUTION INTRAMUSCULAR; INTRAVENOUS; SUBCUTANEOUS at 03:07

## 2025-07-25 RX ADMIN — MICONAZOLE NITRATE: 20 CREAM TOPICAL at 09:07

## 2025-07-25 RX ADMIN — ERGOCALCIFEROL 50000 UNITS: 1.25 CAPSULE ORAL at 10:07

## 2025-07-25 RX ADMIN — PANTOPRAZOLE SODIUM 40 MG: 40 TABLET, DELAYED RELEASE ORAL at 10:07

## 2025-07-25 RX ADMIN — CEFTRIAXONE SODIUM 1 G: 1 INJECTION, POWDER, FOR SOLUTION INTRAMUSCULAR; INTRAVENOUS at 03:07

## 2025-07-25 RX ADMIN — FUROSEMIDE 80 MG: 40 TABLET ORAL at 10:07

## 2025-07-25 RX ADMIN — CARVEDILOL 25 MG: 12.5 TABLET, FILM COATED ORAL at 09:07

## 2025-07-25 RX ADMIN — ENOXAPARIN SODIUM 30 MG: 30 INJECTION SUBCUTANEOUS at 03:07

## 2025-07-25 RX ADMIN — ASPIRIN 81 MG CHEWABLE TABLET 81 MG: 81 TABLET CHEWABLE at 10:07

## 2025-07-25 RX ADMIN — SEVELAMER CARBONATE 1600 MG: 800 TABLET, FILM COATED ORAL at 10:07

## 2025-07-25 NOTE — CONSULTS
Nephrology Initial Consult Note    Patient Name: Pamela Harris  Age: 40 y.o.  : 1985  MRN: [id number]  Admission Date: 2025    Reason for Consult:      Chronic hemodialysis management    HPI:     Pamela Harris is a 40 y.o. female with a history of ESRD on HD TTS, type 1 diabetes, CAD status post cardiac stents, hypertension, hyper cholesterolemia who presented to the ED via EMS yesterday with chest pain which had started 1 hour prior to her dialysis session.  Aspirin and nitroglycerin were administered in route which improved her symptoms.  Her last full session of dialysis was on 2025.  ER workup had initial troponin level of 13, noted to be trending downward.  EKG showed normal sinus rhythm without ST or T-wave abnormality.  Patient was noted to be hypokalemic with potassium level of 2.9.  This morning it is 3.6.  Chest x-ray showed enlarged cardiac silhouette, otherwise no acute cardiopulmonary disease.  Cis was consulted for further evaluation.  Of note, patient also noted with nonproductive cough, she tested positive for strep on 2025 after developing symptoms of cough, throat pain and fever.  She reportedly received an antibiotic injection.    Patient is lying in bed awake.  She reports external vaginal itching that started over the last couple of days, she reports clear vaginal discharge.  No medications have been taken and treat symptoms, she states that she prefers not to take oral medications.  She denies any urinary symptoms, she does make a small amount of urine.  Patient has no other physical complaints or concerns.    Current Medications[1]    No, Primary Doctor    Past Medical History:   Diagnosis Date    Dialysis patient     DM (diabetes mellitus)     ESRD on hemodialysis     Gallstones     High cholesterol     HTN (hypertension)       Past Surgical History:   Procedure Laterality Date     SECTION      x3    CT guided biopsy renal  10/04/2022    CT guided Biopsy Renal  [facility] CT Imaging  11/04/2022    IJ Tunneled cath exchange Right 11/30/2022    INSERTION OF TUNNELED CATHETER Right 10/24/2022    LEFT HEART CATHETERIZATION Left 10/26/2022    LUE Basillic Vein Transposition Left  Left 11/30/2022    TC exchange N/A 11/14/2022      Family History   Problem Relation Name Age of Onset    Stroke Mother      Hypertension Mother      Diabetes Mother      Stroke Sister      Hypertension Sister      Diabetes Sister      Cancer Maternal Aunt       Social History     Tobacco Use    Smoking status: Never     Passive exposure: Never    Smokeless tobacco: Never   Substance Use Topics    Alcohol use: Never     Prescriptions Prior to Admission[2]  Review of patient's allergies indicates:   Allergen Reactions    Clonidine Anaphylaxis     Patch- burned skin              Review of Systems:       Comprehensive 12-point ROS performed, all systems negative except those noted in the HPI.    Objective:       VITAL SIGNS: 24 HR MIN & MAX LAST    Temp  Min: 98 °F (36.7 °C)  Max: 98.4 °F (36.9 °C)  98.1 °F (36.7 °C)        BP  Min: 151/74  Max: 180/72  (!) 180/72     Pulse  Min: 64  Max: 78  76     Resp  Min: 13  Max: 25  16    SpO2  Min: 90 %  Max: 100 %  (!) 90 %      Physical Exam  Vitals reviewed.   Constitutional:       General: She is not in acute distress.  HENT:      Head: Normocephalic.      Right Ear: Hearing normal.      Left Ear: Hearing normal.      Nose: Nose normal.      Mouth/Throat:      Lips: Pink.      Mouth: Mucous membranes are moist.   Cardiovascular:      Rate and Rhythm: Normal rate and regular rhythm.      Heart sounds: S1 normal and S2 normal.   Pulmonary:      Effort: Pulmonary effort is normal. No respiratory distress.      Breath sounds: Normal breath sounds and air entry. No wheezing or rhonchi.   Abdominal:      General: Bowel sounds are normal.      Palpations: Abdomen is soft.      Tenderness: There is no abdominal tenderness.   Genitourinary:     Comments:  Deferred  Musculoskeletal:      Right lower leg: No edema.      Left lower leg: No edema.   Skin:     General: Skin is warm and dry.   Neurological:      Mental Status: She is alert and oriented to person, place, and time.   Psychiatric:         Attention and Perception: Attention normal.         Mood and Affect: Mood and affect normal.         Speech: Speech normal.         Behavior: Behavior normal. Behavior is cooperative.         Cognition and Memory: Cognition normal.         Dialysis access:  Left upper extremity AVF with positive bruit and positive thrill          Component Value Date/Time     07/25/2025 0402     07/24/2025 1606     01/10/2023 0746     11/14/2022 0814    K 3.6 07/25/2025 0402    K 2.9 (L) 07/24/2025 1606    K 4.6 06/06/2024 0940    K 4.6 01/25/2024 0807    K 4.8 01/10/2023 0746    CO2 26 07/25/2025 0402    CO2 32 (H) 07/24/2025 1606    CO2 23 01/10/2023 0746    CO2 20 (L) 11/14/2022 0814    BUN 29.8 (H) 07/25/2025 0402    BUN 22.1 (H) 07/24/2025 1606    BUN 62 (H) 05/08/2025 0000    BUN 57 (H) 03/04/2025 0000    CREATININE 5.77 (H) 07/25/2025 0402    CREATININE 4.29 (H) 07/24/2025 1606    CREATININE 4.57 (H) 01/10/2023 0746    CREATININE 5.4 (H) 11/14/2022 0814    CALCIUM 9.0 07/25/2025 0402    CALCIUM 9.2 07/24/2025 1606    CALCIUM 8.3 (L) 01/10/2023 0746    CALCIUM 8 (L) 11/14/2022 0814            Component Value Date/Time    WBC 5.40 07/25/2025 0549    WBC 6.08 07/24/2025 1606    HGB 11.2 (L) 07/25/2025 0549    HGB 11.1 (L) 07/24/2025 1606    HGB 10.2 (L) 07/22/2025 0000    HGB 10.5 (L) 07/15/2025 0000    HCT 34.9 (L) 07/25/2025 0549    HCT 34.1 (L) 07/24/2025 1606     07/25/2025 0549     07/24/2025 1606         X-Ray Chest AP Portable   Final Result      Borderline enlargement of the cardiac silhouette.  Otherwise, no acute cardiopulmonary disease.         Electronically signed by: Domingo Giang   Date:    07/24/2025   Time:    15:44           Assessment / Plan:     ESRD on HD usual days TTS  Chest pain with history of CAD status post PCI-cardiology consulted  Hypertension  Anemia secondary to CKD  Type 1 diabetes  Hyperlipidemia  Vaginal candidiasis    Recommendations:   We will plan for hemodialysis tomorrow and continue TTS schedule.  No acute indication for hemodialysis today.  We will obtain iron studies and phosphorus level with labs in the morning.  Blood pressure elevated, patient's home antihypertensives resume this morning.  Nurse reports that antihypertensives were given 10 minutes prior to most recent blood pressure taken.  We will reassess.  Monistat for vaginal candidiasis        Thank you for this consult         [1]   Current Facility-Administered Medications   Medication Dose Route Frequency Provider Last Rate Last Admin    acetaminophen tablet 1,000 mg  1,000 mg Oral Q6H PRN Bharat Nassar FNP        aluminum-magnesium hydroxide-simethicone 200-200-20 mg/5 mL suspension 30 mL  30 mL Oral QID PRN Bharat Nassar FNP        amLODIPine tablet 10 mg  10 mg Oral Daily Isela Foreman MD   10 mg at 07/25/25 1053    aspirin chewable tablet 81 mg  81 mg Oral QAM Isela Foreman MD   81 mg at 07/25/25 1054    atorvastatin tablet 80 mg  80 mg Oral Daily Isela Foreman MD   80 mg at 07/25/25 1054    bisacodyL suppository 10 mg  10 mg Rectal Daily PRN Bharat Nassar FNP        calcitRIOL capsule 0.25 mcg  0.25 mcg Oral Daily Isela Foreman MD   0.25 mcg at 07/25/25 1052    carvediloL tablet 25 mg  25 mg Oral BID Isela Foreman MD   25 mg at 07/25/25 1053    cefTRIAXone injection 1 g  1 g Intravenous Q24H Isela Foreman MD        dextrose 50% injection 12.5 g  12.5 g Intravenous PRN Ophelia Briscoe FNP        dextrose 50% injection 12.5 g  12.5 g Intravenous PRN Bharat Nassar FNP        dextrose 50% injection 25 g  25 g Intravenous PRN Ophelia Briscoe FNP        dextrose 50% injection 25 g  25 g Intravenous PRN  Bharat Nassar FNP        enoxaparin injection 30 mg  30 mg Subcutaneous Daily Bharat Nassar FNP        ergocalciferol capsule 50,000 Units  50,000 Units Oral Q7 Days Isela Foreman MD   50,000 Units at 07/25/25 1053    furosemide tablet 80 mg  80 mg Oral QAM Isela Foreman MD   80 mg at 07/25/25 1053    glucagon (human recombinant) injection 1 mg  1 mg Intramuscular PRN Ophelia Briscoe, NADEEM        glucagon (human recombinant) injection 1 mg  1 mg Intramuscular PRN Bharat Nassar, NADEEM        glucose chewable tablet 16 g  16 g Oral PRN Ophelia Briscoe, ALVINP        glucose chewable tablet 16 g  16 g Oral PRN Bharat Nassar, ALVINP        glucose chewable tablet 24 g  24 g Oral PRN Ophelia Briscoe, ALVINP        glucose chewable tablet 24 g  24 g Oral PRN Bharat Nassar FNP        insulin aspart U-100 injection 0-10 Units  0-10 Units Subcutaneous QID (AC + HS) PRN Ophelia Briscoe FNP   2 Units at 07/24/25 2337    insulin glargine U-100 (Lantus) injection 14 Units  14 Units Subcutaneous Daily Isela Foreman MD   14 Units at 07/25/25 1052    losartan tablet 100 mg  100 mg Oral Daily Isela Foreman MD   100 mg at 07/25/25 1054    miconazole 2 % cream   Topical (Top) BID Becca Magaña FNP        naloxone 0.4 mg/mL injection 0.02 mg  0.02 mg Intravenous PRN Bharat Nassar FNP        pantoprazole EC tablet 40 mg  40 mg Oral Daily Isela Foreman MD   40 mg at 07/25/25 1052    sevelamer carbonate tablet 1,600 mg  1,600 mg Oral TID Isela Foreman MD   1,600 mg at 07/25/25 1053    sodium bicarbonate tablet 650 mg  650 mg Oral Daily Isela Foreman MD   650 mg at 07/25/25 1054    sodium chloride 0.9% flush 10 mL  10 mL Intravenous PRN Bharat Nassar FNP        ticagrelor tablet 90 mg  90 mg Oral BID Isela Foreman MD   90 mg at 07/25/25 1053   [2]   Medications Prior to Admission   Medication Sig Dispense Refill Last Dose/Taking    amLODIPine (NORVASC) 10 MG tablet Take 10 mg by mouth.    "2025    aspirin 81 MG Chew Take 81 mg by mouth every morning.   2025    BASAGLAR KWIKPEN U-100 INSULIN glargine 100 units/mL SubQ pen Inject into the skin.   2025    calcitRIOL (ROCALTROL) 0.25 MCG Cap Take 0.25 mcg by mouth.   2025    furosemide (LASIX) 80 MG tablet Take 80 mg by mouth every morning.   2025    insulin lispro 100 unit/mL pen SMARTSI Unit(s) SUB-Q 3 Times Daily   2025    multivitamin (THERAGRAN) per tablet Take 1 tablet by mouth.   Past Week    atorvastatin (LIPITOR) 80 MG tablet Take 80 mg by mouth.       BD ULTRA-FINE SHORT PEN NEEDLE 31 gauge x 5/16" Ndle Inject into the skin 2 (two) times daily.       carvediloL (COREG) 25 MG tablet Take 25 mg by mouth 2 (two) times daily.       ergocalciferol (ERGOCALCIFEROL) 50,000 unit Cap Take 50,000 Units by mouth.       insulin syringe-needle U-100 1 mL 31 gauge x 5/16 Syrg Inject 1 Syringe into the skin 4 (four) times daily.       insulin syringes, disposable, 1 mL Syrg For administering her lantus daily and humalog TID       irbesartan (AVAPRO) 150 MG tablet Take 150 mg by mouth.       loratadine (CLARITIN) 10 mg tablet Take 10 mg by mouth daily as needed.       medroxyPROGESTERone (DEPO-PROVERA) 150 mg/mL Syrg Inject into the muscle every 3 (three) months.       metoclopramide HCl (REGLAN) 10 MG tablet Take 10 mg by mouth.       NOVOLOG FLEXPEN U-100 INSULIN 100 unit/mL (3 mL) InPn pen SMARTSI Unit(s) SUB-Q 3 Times Daily       pantoprazole (PROTONIX) 40 MG tablet Take 40 mg by mouth.       promethazine-dextromethorphan (PROMETHAZINE-DM) 6.25-15 mg/5 mL Syrp Take 5 mLs by mouth every 6 (six) hours as needed.       sevelamer carbonate (RENVELA) 800 mg Tab Take 1,600 mg by mouth 3 (three) times daily.       sodium bicarbonate 650 MG tablet Take 1,950 mg by mouth.       ticagrelor (BRILINTA) 90 mg tablet Take 90 mg by mouth.       traMADoL (ULTRAM) 50 mg tablet Take 50 mg by mouth every 6 (six) hours as needed.       " traZODone (DESYREL) 50 MG tablet Take 50 mg by mouth nightly as needed.       TRUE METRIX GLUCOSE TEST STRIP Strp 3 (three) times daily.       TRUEPLUS LANCETS 33 gauge Misc Apply topically.       VELPHORO 500 mg Chew Take 2 tablets by mouth 3 (three) times daily.

## 2025-07-25 NOTE — CONSULTS
Inpatient consult to Cardiology  Consult performed by: Victor Hugo Burciaga MD  Consult ordered by: Massimo Ray MD  Reason for consult: ACS r/o hx of CAD, chest pain      [facility]    Cardiology  Consult Note    Patient Name: Pamela Harris  MRN: [id number]  Admission Date: 7/24/2025  Hospital Length of Stay: 1 days  Code Status: Full Code   Attending Provider: Victor Hugo Burciaga MD   Consulting Provider: Yuridia Kirk RN  Primary Care Physician: No, Primary Doctor  Principal Problem:<principal problem not specified>    Patient information was obtained from patient, past medical records, and ER records.     Subjective:     Chief Complaint/Reason for Consult: ACS r/o hx of CAD, chest pain    HPI: Mrs. Harris, is a 40-year-old female who is known to [facility], Dr. Serrano.  The patient presented to the ER on 7.24.25 with c/o chest pain.  The patient reported the chest pain started 1 hour into dialysis on yesterday.  She described the pain as stabbing and sharp.  The patient received aspirin and nitroglycerin en route with improvement in her symptoms.  The patient's last full run of dialysis was on Tuesday; dialysis days are on T, Th, and Sat.  ER w/u initial troponin was 13 with trend down, EKG showed NSR without ST or T-wave abnormality, hypokalemia and chest x-ray showed an enlarged cardiac silhouette.  Otherwise, no acute cardiopulmonary disease.  [facility] is being consulted for ACS r/o, hx of CAD and chest pain.      PMH: HTN, HLD, DM, ESRD on HD, Gallstones  PSH: C, Renal biopsy, insertion of tunneled catheter, LUE basillic vein transposition, TC exchange  Family History: Mother- DM, HTN, CVA, Sister- DM, HTN, CVA  Social History: Tobacco use- Negative, EtOH abuse_    Previous Cardiac Diagnostics:     [facility] 1.31.22  PTCA and HAYLEY of proximal LAD IVUS LAD      Past Medical History:   Diagnosis Date    Dialysis patient     DM (diabetes mellitus)     ESRD on hemodialysis      "Gallstones     High cholesterol     HTN (hypertension)      Past Surgical History:   Procedure Laterality Date     SECTION      x3    CT guided biopsy renal  10/04/2022    CT guided Biopsy Renal LOLR CT Imaging  2022    IJ Tunneled cath exchange Right 2022    INSERTION OF TUNNELED CATHETER Right 10/24/2022    LEFT HEART CATHETERIZATION Left 10/26/2022    LUE Basillic Vein Transposition Left  Left 2022    TC exchange N/A 2022     Review of patient's allergies indicates:   Allergen Reactions    Clonidine Anaphylaxis     Patch- burned skin       No current facility-administered medications on file prior to encounter.     Current Outpatient Medications on File Prior to Encounter   Medication Sig    amLODIPine (NORVASC) 10 MG tablet Take 10 mg by mouth.    aspirin 81 MG Chew Take 81 mg by mouth every morning.    BASAGLAR KWIKPEN U-100 INSULIN glargine 100 units/mL SubQ pen Inject into the skin.    calcitRIOL (ROCALTROL) 0.25 MCG Cap Take 0.25 mcg by mouth.    furosemide (LASIX) 80 MG tablet Take 80 mg by mouth every morning.    insulin lispro 100 unit/mL pen SMARTSI Unit(s) SUB-Q 3 Times Daily    multivitamin (THERAGRAN) per tablet Take 1 tablet by mouth.    atorvastatin (LIPITOR) 80 MG tablet Take 80 mg by mouth.    BD ULTRA-FINE SHORT PEN NEEDLE 31 gauge x 5/16" Ndle Inject into the skin 2 (two) times daily.    carvediloL (COREG) 25 MG tablet Take 25 mg by mouth 2 (two) times daily.    ergocalciferol (ERGOCALCIFEROL) 50,000 unit Cap Take 50,000 Units by mouth.    insulin syringe-needle U-100 1 mL 31 gauge x 5/16 Syrg Inject 1 Syringe into the skin 4 (four) times daily.    insulin syringes, disposable, 1 mL Syrg For administering her lantus daily and humalog TID    irbesartan (AVAPRO) 150 MG tablet Take 150 mg by mouth.    loratadine (CLARITIN) 10 mg tablet Take 10 mg by mouth daily as needed.    medroxyPROGESTERone (DEPO-PROVERA) 150 mg/mL Syrg Inject into the muscle every 3 (three) " months.    metoclopramide HCl (REGLAN) 10 MG tablet Take 10 mg by mouth.    NOVOLOG FLEXPEN U-100 INSULIN 100 unit/mL (3 mL) InPn pen SMARTSI Unit(s) SUB-Q 3 Times Daily    pantoprazole (PROTONIX) 40 MG tablet Take 40 mg by mouth.    promethazine-dextromethorphan (PROMETHAZINE-DM) 6.25-15 mg/5 mL Syrp Take 5 mLs by mouth every 6 (six) hours as needed.    sevelamer carbonate (RENVELA) 800 mg Tab Take 1,600 mg by mouth 3 (three) times daily.    sodium bicarbonate 650 MG tablet Take 1,950 mg by mouth.    ticagrelor (BRILINTA) 90 mg tablet Take 90 mg by mouth.    traMADoL (ULTRAM) 50 mg tablet Take 50 mg by mouth every 6 (six) hours as needed.    traZODone (DESYREL) 50 MG tablet Take 50 mg by mouth nightly as needed.    TRUE METRIX GLUCOSE TEST STRIP Strp 3 (three) times daily.    TRUEPLUS LANCETS 33 gauge Misc Apply topically.    VELPHORO 500 mg Chew Take 2 tablets by mouth 3 (three) times daily.     Family History       Problem Relation (Age of Onset)    Cancer Maternal Aunt    Diabetes Mother, Sister    Hypertension Mother, Sister    Stroke Mother, Sister          Tobacco Use    Smoking status: Never     Passive exposure: Never    Smokeless tobacco: Never   Substance and Sexual Activity    Alcohol use: Never    Drug use: Never    Sexual activity: Not on file       Review of Systems   Constitutional: Negative.    Respiratory: Negative.     Cardiovascular:  Positive for chest pain.   Musculoskeletal: Negative.    Skin: Negative.    Neurological: Negative.    Psychiatric/Behavioral: Negative.       Objective:     Vital Signs (Most Recent):  Temp: 98.3 °F (36.8 °C) (25 0350)  Pulse: 64 (25 0350)  Resp: 16 (25 0350)  BP: (!) 151/74 (25 0350)  SpO2: (!) 90 % (25 0350) Vital Signs (24h Range):  Temp:  [98 °F (36.7 °C)-98.4 °F (36.9 °C)] 98.3 °F (36.8 °C)  Pulse:  [64-78] 64  Resp:  [13-25] 16  SpO2:  [90 %-100 %] 90 %  BP: (151-176)/() 151/74   Weight: 77.9 kg (171 lb 12.8 oz)  Body  mass index is 28.59 kg/m².  SpO2: (!) 90 %     No intake or output data in the 24 hours ending 07/25/25 0712  Lines/Drains/Airways       Peripheral Intravenous Line  Duration             Peripheral IV Single Lumen 07/24/25 1612 20 G Right Antecubital <1 day                  Significant Labs:   Chemistries:   Recent Labs   Lab 07/24/25  1606 07/25/25  0402    137   K 2.9* 3.6   CL 93* 95*   CO2 32* 26   BUN 22.1* 29.8*   CREATININE 4.29* 5.77*   CALCIUM 9.2 9.0   PROT 8.7* 7.8   BILITOT 1.0 0.9   ALKPHOS 213* 184*   ALT 13 12   AST 15 15   MG 2.00 2.20        CBC/Anemia Labs: Coags:    Recent Labs   Lab 07/22/25  0000 07/24/25  1606 07/25/25  0549   WBC  --  6.08 5.40   HGB 10.2* 11.1* 11.2*   HCT  --  34.1* 34.9*   PLT  --  237 207   MCV  --  89.7 88.8   RDW  --  14.7 14.7    Recent Labs   Lab 07/24/25  1606   INR 1.1        Significant Imaging:  Imaging Results              X-Ray Chest AP Portable (Final result)  Result time 07/24/25 15:44:39      Final result by Domingo Giang MD (07/24/25 15:44:39)                   Impression:      Borderline enlargement of the cardiac silhouette.  Otherwise, no acute cardiopulmonary disease.      Electronically signed by: Domingo Giang  Date:    07/24/2025  Time:    15:44               Narrative:    EXAMINATION:  XR CHEST AP PORTABLE    CLINICAL HISTORY:  Chest pain, unspecified    TECHNIQUE:  Single frontal view of the chest was performed.    COMPARISON:  None    FINDINGS:  Trachea is midline.  Borderline enlargement of the cardiac silhouette.  No evidence of pleural effusion, focal consolidation, or pneumothorax.  Osseous structures are intact.                                    EKG:     Telemetry:  SR    Physical Exam  Vitals reviewed.   HENT:      Head: Normocephalic.   Cardiovascular:      Rate and Rhythm: Normal rate and regular rhythm.   Pulmonary:      Effort: Pulmonary effort is normal.   Musculoskeletal:         General: Normal range of motion.   Skin:      "General: Skin is warm.   Neurological:      General: No focal deficit present.      Mental Status: She is alert and oriented to person, place, and time.   Psychiatric:         Mood and Affect: Mood normal.       Home Medications:   Medications Ordered Prior to Encounter[1]  Current Schedule Inpatient Medications:   enoxparin  30 mg Subcutaneous Daily         Assessment:     ACS r/o chest pain   --Troponin levels 13>>11>>12  NSTEMI (history of)   --PTCA and HAYLEY of proximal LAD IVUS LAD  HTN  HLD  DM (uncontrolled)   --Hgb A1c 10.1  ESRD on HD  Gallstones    Plan:     Echo pending  Continue ASA  Continue atorvastatin 80 mg daily  Continue amlodipine 10 mg daily  Continue carvedilol 12.5 mg BID  Transcribed in the presence of Dr. Lin    Thank you for your consult.     Yuridia Kirk RN  Cardiology  [facility]         [1]   No current facility-administered medications on file prior to encounter.     Current Outpatient Medications on File Prior to Encounter   Medication Sig Dispense Refill    amLODIPine (NORVASC) 10 MG tablet Take 10 mg by mouth.      aspirin 81 MG Chew Take 81 mg by mouth every morning.      BASAGLAR KWIKPEN U-100 INSULIN glargine 100 units/mL SubQ pen Inject into the skin.      calcitRIOL (ROCALTROL) 0.25 MCG Cap Take 0.25 mcg by mouth.      furosemide (LASIX) 80 MG tablet Take 80 mg by mouth every morning.      insulin lispro 100 unit/mL pen SMARTSI Unit(s) SUB-Q 3 Times Daily      multivitamin (THERAGRAN) per tablet Take 1 tablet by mouth.      atorvastatin (LIPITOR) 80 MG tablet Take 80 mg by mouth.      BD ULTRA-FINE SHORT PEN NEEDLE 31 gauge x 5/16" Ndle Inject into the skin 2 (two) times daily.      carvediloL (COREG) 25 MG tablet Take 25 mg by mouth 2 (two) times daily.      ergocalciferol (ERGOCALCIFEROL) 50,000 unit Cap Take 50,000 Units by mouth.      insulin syringe-needle U-100 1 mL 31 gauge x 5/16 Syrg Inject 1 Syringe into the skin 4 (four) " times daily.      insulin syringes, disposable, 1 mL Syrg For administering her lantus daily and humalog TID      irbesartan (AVAPRO) 150 MG tablet Take 150 mg by mouth.      loratadine (CLARITIN) 10 mg tablet Take 10 mg by mouth daily as needed.      medroxyPROGESTERone (DEPO-PROVERA) 150 mg/mL Syrg Inject into the muscle every 3 (three) months.      metoclopramide HCl (REGLAN) 10 MG tablet Take 10 mg by mouth.      NOVOLOG FLEXPEN U-100 INSULIN 100 unit/mL (3 mL) InPn pen SMARTSI Unit(s) SUB-Q 3 Times Daily      pantoprazole (PROTONIX) 40 MG tablet Take 40 mg by mouth.      promethazine-dextromethorphan (PROMETHAZINE-DM) 6.25-15 mg/5 mL Syrp Take 5 mLs by mouth every 6 (six) hours as needed.      sevelamer carbonate (RENVELA) 800 mg Tab Take 1,600 mg by mouth 3 (three) times daily.      sodium bicarbonate 650 MG tablet Take 1,950 mg by mouth.      ticagrelor (BRILINTA) 90 mg tablet Take 90 mg by mouth.      traMADoL (ULTRAM) 50 mg tablet Take 50 mg by mouth every 6 (six) hours as needed.      traZODone (DESYREL) 50 MG tablet Take 50 mg by mouth nightly as needed.      TRUE METRIX GLUCOSE TEST STRIP Strp 3 (three) times daily.      TRUEPLUS LANCETS 33 gauge Misc Apply topically.      VELPHORO 500 mg Chew Take 2 tablets by mouth 3 (three) times daily.

## 2025-07-25 NOTE — H&P
[facility] Hospital Medicine History & Physical Examination       Patient Name: Pamela Harris  MRN: [id number]  Patient Class: IP- Inpatient   Admission Date: 2025   Admitting Physician: JOSE Service   Length of Stay: 1  Attending Physician: Isela Foreman MD  Primary Care Provider: Debbi Primary Doctor  Face-to-Face encounter date: 2025  Chief Complaint: Chest Pain (Chest pain x1 hour when dialyzing, only ran 2 hours. HX of ERSD. T/TH/S. Hx of stent placement x 3 years ago. Received 324 asa, 2 sprays of nitro. Denies sob. 97 on RA)      Screening for Social Drivers for health:  Patient screened for food insecurity, housing instability, transportation needs, utility difficulties, and interpersonal safety (select all that apply as identified as concern)  []Housing or Food  []Transportation Needs  []Utility Difficulties  []Interpersonal safety  [x]None      Patient information was obtained from patient, patient's family, past medical records and ER records.  ED records were reviewed in detail and documented below    HISTORY OF PRESENT ILLNESS:     40-year-old  female with significant history of CAD status post PCI on dual antiplatelets, HTN, HLD, type 2 diabetes mellitus, cholelithiasis, ESRD on HD-TTS schedule presented to the ED with complaints of chest pain which started while she was at dialysis.  Chest pain is stabbing/sharp.  Patient was significantly hypotensive in the ED. lab significant for severe hypokalemia, ESRD, troponins negative.  Chest x-ray with cardiomegaly, no active pulmonary disease.  Hospital medicine services consulted for admission and cardiology services consulted to further evaluate chest pain    PAST MEDICAL HISTORY:   HTN   CAD status post PCI  HLD   Type 2 diabetes mellitus   ESRD on HD   Cholelithiasis      PAST SURGICAL HISTORY:     Past Surgical History:   Procedure Laterality Date     SECTION      x3    CT guided biopsy renal  " 10/04/2022    CT guided Biopsy Renal LOLR CT Imaging  2022    IJ Tunneled cath exchange Right 2022    INSERTION OF TUNNELED CATHETER Right 10/24/2022    LEFT HEART CATHETERIZATION Left 10/26/2022    LUE Basillic Vein Transposition Left  Left 2022    TC exchange N/A 2022       ALLERGIES:   Clonidine    FAMILY HISTORY:   Reviewed and negative    SOCIAL HISTORY:     Denied active tobacco use, alcohol use, illicit drug use       HOME MEDICATIONS:     Prior to Admission medications    Medication Sig Start Date End Date Taking? Authorizing Provider   amLODIPine (NORVASC) 10 MG tablet Take 10 mg by mouth. 23  Yes Provider, Historical   aspirin 81 MG Chew Take 81 mg by mouth every morning. 23  Yes Provider, Historical   BASAGLAR KWIKPEN U-100 INSULIN glargine 100 units/mL SubQ pen Inject into the skin. 23  Yes Provider, Historical   calcitRIOL (ROCALTROL) 0.25 MCG Cap Take 0.25 mcg by mouth. 22  Yes Provider, Historical   furosemide (LASIX) 80 MG tablet Take 80 mg by mouth every morning. 23  Yes Provider, Historical   insulin lispro 100 unit/mL pen SMARTSI Unit(s) SUB-Q 3 Times Daily 3/28/23  Yes Provider, Historical   multivitamin (THERAGRAN) per tablet Take 1 tablet by mouth.   Yes Provider, Historical   atorvastatin (LIPITOR) 80 MG tablet Take 80 mg by mouth. 23   Provider, Historical   BD ULTRA-FINE SHORT PEN NEEDLE 31 gauge x 5/16" Ndle Inject into the skin 2 (two) times daily. 23   Provider, Historical   carvediloL (COREG) 25 MG tablet Take 25 mg by mouth 2 (two) times daily. 5/15/23   Provider, Historical   ergocalciferol (ERGOCALCIFEROL) 50,000 unit Cap Take 50,000 Units by mouth. 10/19/22   Provider, Historical   insulin syringe-needle U-100 1 mL 31 gauge x 5/16 Syrg Inject 1 Syringe into the skin 4 (four) times daily. 23   Provider, Historical   insulin syringes, disposable, 1 mL Syrg For administering her lantus daily and humalog TID 12/3/22   " Provider, Historical   irbesartan (AVAPRO) 150 MG tablet Take 150 mg by mouth. 5/10/21   Provider, Historical   loratadine (CLARITIN) 10 mg tablet Take 10 mg by mouth daily as needed. 23   Provider, Historical   medroxyPROGESTERone (DEPO-PROVERA) 150 mg/mL Syrg Inject into the muscle every 3 (three) months. 23   Provider, Historical   metoclopramide HCl (REGLAN) 10 MG tablet Take 10 mg by mouth.    Provider, Historical   NOVOLOG FLEXPEN U-100 INSULIN 100 unit/mL (3 mL) InPn pen SMARTSI Unit(s) SUB-Q 3 Times Daily 23   Provider, Historical   pantoprazole (PROTONIX) 40 MG tablet Take 40 mg by mouth. 23   Provider, Historical   promethazine-dextromethorphan (PROMETHAZINE-DM) 6.25-15 mg/5 mL Syrp Take 5 mLs by mouth every 6 (six) hours as needed. 23   Provider, Historical   sevelamer carbonate (RENVELA) 800 mg Tab Take 1,600 mg by mouth 3 (three) times daily. 23   Provider, Historical   sodium bicarbonate 650 MG tablet Take 1,950 mg by mouth. 10/19/22   Provider, Historical   ticagrelor (BRILINTA) 90 mg tablet Take 90 mg by mouth.    Provider, Historical   traMADoL (ULTRAM) 50 mg tablet Take 50 mg by mouth every 6 (six) hours as needed. 23   Provider, Historical   traZODone (DESYREL) 50 MG tablet Take 50 mg by mouth nightly as needed. 23   Provider, Historical   TRUE METRIX GLUCOSE TEST STRIP Strp 3 (three) times daily. 23   Provider, Historical   TRUEPLUS LANCETS 33 gauge Misc Apply topically. 23   Provider, Historical   VELPHORO 500 mg Chew Take 2 tablets by mouth 3 (three) times daily. 23   Provider, Historical       REVIEW OF SYSTEMS:   Except as documented, all other systems reviewed and negative     PHYSICAL EXAM:     VITAL SIGNS: 24 HRS MIN & MAX LAST   Temp  Min: 98 °F (36.7 °C)  Max: 98.4 °F (36.9 °C) 98.1 °F (36.7 °C)   BP  Min: 151/74  Max: 180/72 (!) 180/72   Pulse  Min: 64  Max: 78  76   Resp  Min: 13  Max: 25 16   SpO2  Min: 90 %  Max: 100 % (!) 90 %      General appearance:  No acute distress  HENT: Atraumatic   Lungs: Clear to auscultation bilaterally.   Heart: RRR,No edema  Abdomen: Soft, non tender   Extremities: warm  Neuro:  Awake, alert, oriented x4  Psych/mental status: Appropriate mood and affect.    LABS AND IMAGING:     Recent Labs   Lab 07/22/25  0000 07/24/25  1606 07/25/25  0549   WBC  --  6.08 5.40   RBC  --  3.80* 3.93*   HGB 10.2* 11.1* 11.2*   HCT  --  34.1* 34.9*   MCV  --  89.7 88.8   MCH  --  29.2 28.5   MCHC  --  32.6* 32.1*   RDW  --  14.7 14.7   PLT  --  237 207   MPV  --  10.4 9.9       Recent Labs   Lab 07/24/25  1606 07/25/25  0402    137   K 2.9* 3.6   CL 93* 95*   CO2 32* 26   BUN 22.1* 29.8*   CREATININE 4.29* 5.77*   * 174*   CALCIUM 9.2 9.0   MG 2.00 2.20   ALBUMIN 3.6 3.3*   PROT 8.7* 7.8   ALKPHOS 213* 184*   ALT 13 12   AST 15 15   BILITOT 1.0 0.9       Microbiology Results (last 7 days)       ** No results found for the last 168 hours. **             X-Ray Chest AP Portable  Narrative: EXAMINATION:  XR CHEST AP PORTABLE    CLINICAL HISTORY:  Chest pain, unspecified    TECHNIQUE:  Single frontal view of the chest was performed.    COMPARISON:  None    FINDINGS:  Trachea is midline.  Borderline enlargement of the cardiac silhouette.  No evidence of pleural effusion, focal consolidation, or pneumothorax.  Osseous structures are intact.  Impression: Borderline enlargement of the cardiac silhouette.  Otherwise, no acute cardiopulmonary disease.    Electronically signed by: Domingo Giang  Date:    07/24/2025  Time:    15:44      ASSESSMENT & PLAN:     Atypical chest pain-rule out ACS  Poorly-controlled HTN/hypertensive urgency  Strep throat  History of CAD status post PCI  HLD   Type 2 diabetes mellitus-poorly controlled with A1c more than 10  History of cholelithiasis   ESRD on HD-TTS schedule   History of GERD  Prolonged QT  Prophylaxis    Troponins negative   EKG-non impressive   Echo is non impressive   Cardiology on  board  QT is prolonged, avoid QT prolonging agents   Check magnesium   Resumed home aspirin, Brilinta, high-intensity statin, Coreg and losartan  IV ceftriaxone for strep throat  Blood pressure is poorly controlled, better after resuming amlodipine, Coreg and losartan, in case of persistent acceleration plan to add hydralazine  Nephrology consulted to resume hemodialysis while in-house, last dialyzed 7/24, but did not complete session secondary to chest pain   Diabetes is poorly controlled   On sliding scale   I increased home Lantus to 18 units daily   Resumed other home meds-calcitriol, vitamin-D, Lasix, ppi, Renvela, p.o. bicarb  DVT prophylaxis-subQ Lovenox      If BP at goal and cleared by Cardiology patient could potentially go home tomorrow    __________________________________________________________________________  INPATIENT LIST OF MEDICATIONS     Scheduled Meds:   amLODIPine  10 mg Oral Daily    aspirin  81 mg Oral QAM    atorvastatin  80 mg Oral Daily    calcitRIOL  0.25 mcg Oral Daily    carvediloL  25 mg Oral BID    enoxparin  30 mg Subcutaneous Daily    ergocalciferol  50,000 Units Oral Q7 Days    furosemide  80 mg Oral QAM    insulin glargine U-100 (Lantus)  14 Units Subcutaneous Daily    losartan  100 mg Oral Daily    pantoprazole  40 mg Oral Daily    sevelamer carbonate  1,600 mg Oral TID    sodium bicarbonate  650 mg Oral Daily    ticagrelor  90 mg Oral BID     Continuous Infusions:  PRN Meds:.  Current Facility-Administered Medications:     acetaminophen, 1,000 mg, Oral, Q6H PRN    aluminum-magnesium hydroxide-simethicone, 30 mL, Oral, QID PRN    bisacodyL, 10 mg, Rectal, Daily PRN    dextrose 50%, 12.5 g, Intravenous, PRN    dextrose 50%, 12.5 g, Intravenous, PRN    dextrose 50%, 25 g, Intravenous, PRN    dextrose 50%, 25 g, Intravenous, PRN    glucagon (human recombinant), 1 mg, Intramuscular, PRN    glucagon (human recombinant), 1 mg, Intramuscular, PRN    glucose, 16 g, Oral, PRN    glucose,  16 g, Oral, PRN    glucose, 24 g, Oral, PRN    glucose, 24 g, Oral, PRN    insulin aspart U-100, 0-10 Units, Subcutaneous, QID (AC + HS) PRN    naloxone, 0.02 mg, Intravenous, PRN    sodium chloride 0.9%, 10 mL, Intravenous, PRN        If patient was admitted under observational status it is with my approval/permission.        All diagnosis and differential diagnosis have been reviewed; assessment and plan has been documented; I have personally reviewed the labs and test results that are presently available; I have reviewed the patients medication list; I have reviewed the consulting providers response and recommendations. I have reviewed or attempted to review medical records based upon their availability.    All of the patient and family questions have been addressed and answered. Patient's is agreeable to the above stated plan. I will continue to monitor closely and make adjustments to medical management as needed.    If patient was admitted under observational status it is with my approval/permission.      Isela Foreman MD   07/25/2025

## 2025-07-25 NOTE — PLAN OF CARE
07/25/25 0821   Discharge Assessment   Assessment Type Discharge Planning Assessment   Confirmed/corrected address, phone number and insurance Yes   Confirmed Demographics Correct on Facesheet   Source of Information patient   People in Home friend(s)   Do you expect to return to your current living situation? Yes   Current cognitive status: Alert/Oriented   Walking or Climbing Stairs Difficulty no   Dressing/Bathing Difficulty no   Home Accessibility wheelchair accessible   Home Layout Able to live on 1st floor   Equipment Currently Used at Home none   Readmission within 30 days? No   Do you currently have service(s) that help you manage your care at home? No   Do you take prescription medications? Yes   Do you have prescription coverage? Yes   Do you have any problems affording any of your prescribed medications? No   How do you get to doctors appointments? health plan transportation   Are you on dialysis? Yes   Dialysis Name and Scheduled days [facility]. Medical transportation transports.   Do you take coumadin? No   Discharge Plan A Home   Discharge Plan B Home   Discharge Plan discussed with: Patient     Patient reports that she has no emergency contact. She lives with friends and is independent.

## 2025-07-26 VITALS
WEIGHT: 171.81 LBS | OXYGEN SATURATION: 92 % | SYSTOLIC BLOOD PRESSURE: 156 MMHG | HEART RATE: 69 BPM | TEMPERATURE: 98 F | BODY MASS INDEX: 28.62 KG/M2 | RESPIRATION RATE: 19 BRPM | DIASTOLIC BLOOD PRESSURE: 71 MMHG | HEIGHT: 65 IN

## 2025-07-26 PROBLEM — R07.9 CHEST PAIN: Status: ACTIVE | Noted: 2025-07-26

## 2025-07-26 LAB
ALBUMIN SERPL-MCNC: 3.2 G/DL (ref 3.5–5)
BASOPHILS # BLD AUTO: 0.05 X10(3)/MCL
BASOPHILS NFR BLD AUTO: 0.8 %
BUN SERPL-MCNC: 48.4 MG/DL (ref 7–18.7)
CALCIUM SERPL-MCNC: 9.4 MG/DL (ref 8.4–10.2)
CHLORIDE SERPL-SCNC: 95 MMOL/L (ref 98–107)
CO2 SERPL-SCNC: 27 MMOL/L (ref 22–29)
CREAT SERPL-MCNC: 7.37 MG/DL (ref 0.55–1.02)
EOSINOPHIL # BLD AUTO: 0.09 X10(3)/MCL (ref 0–0.9)
EOSINOPHIL NFR BLD AUTO: 1.4 %
ERYTHROCYTE [DISTWIDTH] IN BLOOD BY AUTOMATED COUNT: 14.5 % (ref 11.5–17)
FERRITIN SERPL-MCNC: 1665.06 NG/ML (ref 4.63–204)
GFR SERPLBLD CREATININE-BSD FMLA CKD-EPI: 7 ML/MIN/1.73/M2
GLUCOSE SERPL-MCNC: 210 MG/DL (ref 74–100)
GLUCOSE SERPL-MCNC: 212 MG/DL (ref 70–110)
HCT VFR BLD AUTO: 30.3 % (ref 37–47)
HGB BLD-MCNC: 9.9 G/DL (ref 12–16)
IMM GRANULOCYTES # BLD AUTO: 0.02 X10(3)/MCL (ref 0–0.04)
IMM GRANULOCYTES NFR BLD AUTO: 0.3 %
IRON SATN MFR SERPL: 28 % (ref 20–50)
IRON SERPL-MCNC: 50 UG/DL (ref 50–170)
LYMPHOCYTES # BLD AUTO: 1.12 X10(3)/MCL (ref 0.6–4.6)
LYMPHOCYTES NFR BLD AUTO: 17.5 %
MCH RBC QN AUTO: 28.8 PG (ref 27–31)
MCHC RBC AUTO-ENTMCNC: 32.7 G/DL (ref 33–36)
MCV RBC AUTO: 88.1 FL (ref 80–94)
MONOCYTES # BLD AUTO: 0.42 X10(3)/MCL (ref 0.1–1.3)
MONOCYTES NFR BLD AUTO: 6.6 %
NEUTROPHILS # BLD AUTO: 4.69 X10(3)/MCL (ref 2.1–9.2)
NEUTROPHILS NFR BLD AUTO: 73.4 %
NRBC BLD AUTO-RTO: 0 %
PHOSPHATE SERPL-MCNC: 5.4 MG/DL (ref 2.3–4.7)
PLATELET # BLD AUTO: 188 X10(3)/MCL (ref 130–400)
PMV BLD AUTO: 10.1 FL (ref 7.4–10.4)
POCT GLUCOSE: 158 MG/DL (ref 70–110)
POCT GLUCOSE: 189 MG/DL (ref 70–110)
POTASSIUM SERPL-SCNC: 3.7 MMOL/L (ref 3.5–5.1)
RBC # BLD AUTO: 3.44 X10(6)/MCL (ref 4.2–5.4)
SODIUM SERPL-SCNC: 136 MMOL/L (ref 136–145)
TIBC SERPL-MCNC: 129 UG/DL (ref 70–310)
TIBC SERPL-MCNC: 179 UG/DL (ref 250–450)
TRANSFERRIN SERPL-MCNC: 161 MG/DL (ref 180–382)
WBC # BLD AUTO: 6.39 X10(3)/MCL (ref 4.5–11.5)

## 2025-07-26 PROCEDURE — 83550 IRON BINDING TEST: CPT | Performed by: NURSE PRACTITIONER

## 2025-07-26 PROCEDURE — 85025 COMPLETE CBC W/AUTO DIFF WBC: CPT | Performed by: NURSE PRACTITIONER

## 2025-07-26 PROCEDURE — 82728 ASSAY OF FERRITIN: CPT | Performed by: NURSE PRACTITIONER

## 2025-07-26 PROCEDURE — 25000003 PHARM REV CODE 250: Performed by: NURSE PRACTITIONER

## 2025-07-26 PROCEDURE — 63600175 PHARM REV CODE 636 W HCPCS: Performed by: INTERNAL MEDICINE

## 2025-07-26 PROCEDURE — 25000003 PHARM REV CODE 250: Performed by: INTERNAL MEDICINE

## 2025-07-26 PROCEDURE — 5A1D70Z PERFORMANCE OF URINARY FILTRATION, INTERMITTENT, LESS THAN 6 HOURS PER DAY: ICD-10-PCS | Performed by: INTERNAL MEDICINE

## 2025-07-26 PROCEDURE — 36415 COLL VENOUS BLD VENIPUNCTURE: CPT | Performed by: NURSE PRACTITIONER

## 2025-07-26 PROCEDURE — 80069 RENAL FUNCTION PANEL: CPT | Performed by: NURSE PRACTITIONER

## 2025-07-26 PROCEDURE — 90935 HEMODIALYSIS ONE EVALUATION: CPT

## 2025-07-26 RX ORDER — SODIUM CHLORIDE 9 MG/ML
INJECTION, SOLUTION INTRAVENOUS ONCE
Status: CANCELLED | OUTPATIENT
Start: 2025-07-26 | End: 2025-07-26

## 2025-07-26 RX ORDER — NIFEDIPINE 60 MG/1
60 TABLET, EXTENDED RELEASE ORAL DAILY
Status: DISCONTINUED | OUTPATIENT
Start: 2025-07-26 | End: 2025-07-26

## 2025-07-26 RX ORDER — MUPIROCIN 20 MG/G
OINTMENT TOPICAL 2 TIMES DAILY
Status: DISCONTINUED | OUTPATIENT
Start: 2025-07-26 | End: 2025-07-26 | Stop reason: HOSPADM

## 2025-07-26 RX ORDER — NIFEDIPINE 90 MG/1
90 TABLET, EXTENDED RELEASE ORAL DAILY
Status: DISCONTINUED | OUTPATIENT
Start: 2025-07-27 | End: 2025-07-26 | Stop reason: HOSPADM

## 2025-07-26 RX ORDER — AMOXICILLIN 500 MG/1
500 TABLET, FILM COATED ORAL DAILY
Qty: 7 TABLET | Refills: 0 | Status: SHIPPED | OUTPATIENT
Start: 2025-07-26 | End: 2025-08-02

## 2025-07-26 RX ORDER — INSULIN GLARGINE 100 [IU]/ML
20 INJECTION, SOLUTION SUBCUTANEOUS DAILY
Qty: 6 ML | Refills: 0 | Status: SHIPPED | OUTPATIENT
Start: 2025-07-26 | End: 2025-08-25

## 2025-07-26 RX ORDER — NIFEDIPINE 30 MG/1
30 TABLET, EXTENDED RELEASE ORAL ONCE
Status: COMPLETED | OUTPATIENT
Start: 2025-07-26 | End: 2025-07-26

## 2025-07-26 RX ORDER — SODIUM CHLORIDE 9 MG/ML
INJECTION, SOLUTION INTRAVENOUS
Status: CANCELLED | OUTPATIENT
Start: 2025-07-26

## 2025-07-26 RX ORDER — NIFEDIPINE 90 MG/1
90 TABLET, EXTENDED RELEASE ORAL DAILY
Qty: 60 TABLET | Refills: 0 | Status: SHIPPED | OUTPATIENT
Start: 2025-07-27 | End: 2025-09-25

## 2025-07-26 RX ORDER — MUPIROCIN 20 MG/G
OINTMENT TOPICAL 2 TIMES DAILY
Status: CANCELLED | OUTPATIENT
Start: 2025-07-26 | End: 2025-07-31

## 2025-07-26 RX ADMIN — NIFEDIPINE 60 MG: 60 TABLET, FILM COATED, EXTENDED RELEASE ORAL at 10:07

## 2025-07-26 RX ADMIN — PANTOPRAZOLE SODIUM 40 MG: 40 TABLET, DELAYED RELEASE ORAL at 08:07

## 2025-07-26 RX ADMIN — FUROSEMIDE 80 MG: 40 TABLET ORAL at 06:07

## 2025-07-26 RX ADMIN — NIFEDIPINE 30 MG: 30 TABLET, FILM COATED, EXTENDED RELEASE ORAL at 06:07

## 2025-07-26 RX ADMIN — CALCITRIOL CAPSULES 0.25 MCG 0.25 MCG: 0.25 CAPSULE ORAL at 08:07

## 2025-07-26 RX ADMIN — CARVEDILOL 25 MG: 12.5 TABLET, FILM COATED ORAL at 08:07

## 2025-07-26 RX ADMIN — AMLODIPINE BESYLATE 10 MG: 5 TABLET ORAL at 08:07

## 2025-07-26 RX ADMIN — ASPIRIN 81 MG CHEWABLE TABLET 81 MG: 81 TABLET CHEWABLE at 06:07

## 2025-07-26 RX ADMIN — ATORVASTATIN CALCIUM 80 MG: 40 TABLET, FILM COATED ORAL at 08:07

## 2025-07-26 RX ADMIN — INSULIN GLARGINE 18 UNITS: 100 INJECTION, SOLUTION SUBCUTANEOUS at 08:07

## 2025-07-26 RX ADMIN — TICAGRELOR 90 MG: 90 TABLET ORAL at 08:07

## 2025-07-26 RX ADMIN — SODIUM BICARBONATE 650 MG TABLET 650 MG: at 08:07

## 2025-07-26 RX ADMIN — SEVELAMER CARBONATE 1600 MG: 800 TABLET, FILM COATED ORAL at 08:07

## 2025-07-26 RX ADMIN — DIPHENHYDRAMINE HYDROCHLORIDE 50 MG: 25 CAPSULE ORAL at 12:07

## 2025-07-26 RX ADMIN — LOSARTAN POTASSIUM 100 MG: 50 TABLET, FILM COATED ORAL at 08:07

## 2025-07-26 NOTE — PROGRESS NOTES
HPI:      Pamela Harris is a 40 y.o. female with a history of ESRD on HD TTS, type 1 diabetes, CAD status post cardiac stents, hypertension, hyper cholesterolemia who presented to the ED via EMS yesterday with chest pain which had started 1 hour prior to her dialysis session.  Aspirin and nitroglycerin were administered in route which improved her symptoms.  Her last full session of dialysis was on 07/22/2025.  ER workup had initial troponin level of 13, noted to be trending downward.  EKG showed normal sinus rhythm without ST or T-wave abnormality.  Patient was noted to be hypokalemic with potassium level of 2.9.  This morning it is 3.6.  Chest x-ray showed enlarged cardiac silhouette, otherwise no acute cardiopulmonary disease.  [facility] was consulted for further evaluation.  Of note, patient also noted with nonproductive cough, she tested positive for strep on 07/21/2025 after developing symptoms of cough, throat pain and fever.  She reportedly received an antibiotic injection.     Interval Hx:    7/25/25:  Patient is lying in bed awake.  She reports external vaginal itching that started over the last couple of days, she reports clear vaginal discharge.  No medications have been taken and treat symptoms, she states that she prefers not to take oral medications.  She denies any urinary symptoms, she does make a small amount of urine.  Patient has no other physical complaints or concerns.    7/26/25:  Patient resting in bed with no family at bedside.  Patient scheduled to receive dialysis today on TTS schedule.  Labs today are stable with H and H of 9.9 and 30.3, sodium 136, potassium 3.7, CO2 27.  We will continue to follow patient's renal status and repeat labs in a.m..   Currently tolerating dialysis.    [Current Medications]            Current Facility-Administered Medications   Medication Dose Route Frequency Provider Last Rate Last Admin    acetaminophen tablet 1,000 mg  1,000 mg Oral Q6H PRN Bharat Nassar  KIAH, NADEEM        aluminum-magnesium hydroxide-simethicone 200-200-20 mg/5 mL suspension 30 mL  30 mL Oral QID PRN Bharat Nassar FNP        amLODIPine tablet 10 mg  10 mg Oral Daily Isela Foreman MD   10 mg at 07/25/25 1053    aspirin chewable tablet 81 mg  81 mg Oral QAM Isela Foreman MD   81 mg at 07/25/25 1054    atorvastatin tablet 80 mg  80 mg Oral Daily Isela Foreman MD   80 mg at 07/25/25 1054    bisacodyL suppository 10 mg  10 mg Rectal Daily PRN Bharat Nassar, NADEME        calcitRIOL capsule 0.25 mcg  0.25 mcg Oral Daily Isela Foreman MD   0.25 mcg at 07/25/25 1052    carvediloL tablet 25 mg  25 mg Oral BID Isela Foreman MD   25 mg at 07/25/25 1053    cefTRIAXone injection 1 g  1 g Intravenous Q24H Isela Foreman MD        dextrose 50% injection 12.5 g  12.5 g Intravenous PRN Ophelia Briscoe, NADEEM        dextrose 50% injection 12.5 g  12.5 g Intravenous PRN Bharat Nassar, ALVINP        dextrose 50% injection 25 g  25 g Intravenous PRN Ophelia Briscoe, NADEEM        dextrose 50% injection 25 g  25 g Intravenous PRN Bharat Nassar FNP        enoxaparin injection 30 mg  30 mg Subcutaneous Daily Bharat Nassar FNP        ergocalciferol capsule 50,000 Units  50,000 Units Oral Q7 Days Isela Foreman MD   50,000 Units at 07/25/25 1053    furosemide tablet 80 mg  80 mg Oral QAM Isela Foreman MD   80 mg at 07/25/25 1053    glucagon (human recombinant) injection 1 mg  1 mg Intramuscular PRN Ophelia Briscoe FNP        glucagon (human recombinant) injection 1 mg  1 mg Intramuscular PRN Bharat Nassar, NADEEM        glucose chewable tablet 16 g  16 g Oral PRN Ophelia Briscoe, NADEEM        glucose chewable tablet 16 g  16 g Oral PRN Bharat Nassar, ALVINP        glucose chewable tablet 24 g  24 g Oral PRN Ophelia Briscoe, NADEEM        glucose chewable tablet 24 g  24 g Oral PRN Bharat Nassar FNP        insulin aspart U-100 injection 0-10 Units  0-10 Units Subcutaneous QID (AC + HS) PRN  Ophelia Briscoe FNP   2 Units at 25 2337    insulin glargine U-100 (Lantus) injection 14 Units  14 Units Subcutaneous Daily Isela Foreman MD   14 Units at 25 1052    losartan tablet 100 mg  100 mg Oral Daily Isela Foreman MD   100 mg at 25 1054    miconazole 2 % cream   Topical (Top) BID Becca Magaña FNP        naloxone 0.4 mg/mL injection 0.02 mg  0.02 mg Intravenous PRN Bharat Nassar FNP        pantoprazole EC tablet 40 mg  40 mg Oral Daily Isela Foreman MD   40 mg at 25 1052    sevelamer carbonate tablet 1,600 mg  1,600 mg Oral TID Isela Foreman MD   1,600 mg at 25 1053    sodium bicarbonate tablet 650 mg  650 mg Oral Daily Isela Foreman MD   650 mg at 25 1054    sodium chloride 0.9% flush 10 mL  10 mL Intravenous PRN Bharat Nassar FNP        ticagrelor tablet 90 mg  90 mg Oral BID Isela Foreman MD   90 mg at 25 1053        No, Primary Doctor          Past Medical History:   Diagnosis Date    Dialysis patient      DM (diabetes mellitus)      ESRD on hemodialysis      Gallstones      High cholesterol      HTN (hypertension)              Past Surgical History:   Procedure Laterality Date     SECTION         x3    CT guided biopsy renal   10/04/2022    CT guided Biopsy Renal LOL[facility] CT Imaging   2022    IJ Tunneled cath exchange Right 2022    INSERTION OF TUNNELED CATHETER Right 10/24/2022    LEFT HEART CATHETERIZATION Left 10/26/2022    LUE Basillic Vein Transposition Left  Left 2022    TC exchange N/A 2022             Family History   Problem Relation Name Age of Onset    Stroke Mother        Hypertension Mother        Diabetes Mother        Stroke Sister        Hypertension Sister        Diabetes Sister        Cancer Maternal Aunt          Social History            Tobacco Use    Smoking status: Never       Passive exposure: Never    Smokeless tobacco: Never   Substance Use Topics    Alcohol use: Never     "  [Prescriptions Prior to Admission]    [Prescriptions Prior to Admission]          Medications Prior to Admission   Medication Sig Dispense Refill Last Dose/Taking    amLODIPine (NORVASC) 10 MG tablet Take 10 mg by mouth.     2025    aspirin 81 MG Chew Take 81 mg by mouth every morning.     2025    BASAGLAR KWIKPEN U-100 INSULIN glargine 100 units/mL SubQ pen Inject into the skin.     2025    calcitRIOL (ROCALTROL) 0.25 MCG Cap Take 0.25 mcg by mouth.     2025    furosemide (LASIX) 80 MG tablet Take 80 mg by mouth every morning.     2025    insulin lispro 100 unit/mL pen SMARTSI Unit(s) SUB-Q 3 Times Daily     2025    multivitamin (THERAGRAN) per tablet Take 1 tablet by mouth.     Past Week    atorvastatin (LIPITOR) 80 MG tablet Take 80 mg by mouth.          BD ULTRA-FINE SHORT PEN NEEDLE 31 gauge x 5/16" Ndle Inject into the skin 2 (two) times daily.          carvediloL (COREG) 25 MG tablet Take 25 mg by mouth 2 (two) times daily.          ergocalciferol (ERGOCALCIFEROL) 50,000 unit Cap Take 50,000 Units by mouth.          insulin syringe-needle U-100 1 mL 31 gauge x 5/16 Syrg Inject 1 Syringe into the skin 4 (four) times daily.          insulin syringes, disposable, 1 mL Syrg For administering her lantus daily and humalog TID          irbesartan (AVAPRO) 150 MG tablet Take 150 mg by mouth.          loratadine (CLARITIN) 10 mg tablet Take 10 mg by mouth daily as needed.          medroxyPROGESTERone (DEPO-PROVERA) 150 mg/mL Syrg Inject into the muscle every 3 (three) months.          metoclopramide HCl (REGLAN) 10 MG tablet Take 10 mg by mouth.          NOVOLOG FLEXPEN U-100 INSULIN 100 unit/mL (3 mL) InPn pen SMARTSI Unit(s) SUB-Q 3 Times Daily          pantoprazole (PROTONIX) 40 MG tablet Take 40 mg by mouth.          promethazine-dextromethorphan (PROMETHAZINE-DM) 6.25-15 mg/5 mL Syrp Take 5 mLs by mouth every 6 (six) hours as needed.          sevelamer carbonate (RENVELA) " 800 mg Tab Take 1,600 mg by mouth 3 (three) times daily.          sodium bicarbonate 650 MG tablet Take 1,950 mg by mouth.          ticagrelor (BRILINTA) 90 mg tablet Take 90 mg by mouth.          traMADoL (ULTRAM) 50 mg tablet Take 50 mg by mouth every 6 (six) hours as needed.          traZODone (DESYREL) 50 MG tablet Take 50 mg by mouth nightly as needed.          TRUE METRIX GLUCOSE TEST STRIP Strp 3 (three) times daily.          TRUEPLUS LANCETS 33 gauge Misc Apply topically.          VELPHORO 500 mg Chew Take 2 tablets by mouth 3 (three) times daily.                 Review of patient's allergies indicates:   Allergen Reactions    Clonidine Anaphylaxis       Patch- burned skin               Review of Systems:         Comprehensive 12-point ROS performed, all systems negative except those noted in the HPI.     Objective:         Vitals:    07/26/25 0402   BP: (!) 174/78   Pulse: 67   Resp: 18   Temp: 98.2 °F (36.8 °C)      Physical Exam  Vitals reviewed.   Constitutional:       General: She is not in acute distress.  HENT:      Head: Normocephalic.      Right Ear: Hearing normal.      Left Ear: Hearing normal.      Nose: Nose normal.      Mouth/Throat:      Lips: Pink.      Mouth: Mucous membranes are moist.   Cardiovascular:      Rate and Rhythm: Normal rate and regular rhythm.      Heart sounds: S1 normal and S2 normal.   Pulmonary:      Effort: Pulmonary effort is normal. No respiratory distress.      Breath sounds: Normal breath sounds and air entry. No wheezing or rhonchi.   Abdominal:      General: Bowel sounds are normal.      Palpations: Abdomen is soft.      Tenderness: There is no abdominal tenderness.   Genitourinary:     Comments: Deferred  Musculoskeletal:      Right lower leg: No edema.      Left lower leg: No edema.   Skin:     General: Skin is warm and dry.   Neurological:      Mental Status: She is alert and oriented to person, place, and time.   Psychiatric:         Attention and Perception:  Attention normal.         Mood and Affect: Mood and affect normal.         Speech: Speech normal.         Behavior: Behavior normal. Behavior is cooperative.         Cognition and Memory: Cognition normal.            Dialysis access:  Left upper extremity AVF with positive bruit and positive thrill                    Latest Reference Range & Units 07/26/25 02:11   WBC 4.50 - 11.50 x10(3)/mcL 6.39   RBC 4.20 - 5.40 x10(6)/mcL 3.44 (L)   Hemoglobin 12.0 - 16.0 g/dL 9.9 (L)   Hematocrit 37.0 - 47.0 % 30.3 (L)   MCV 80.0 - 94.0 fL 88.1   MCH 27.0 - 31.0 pg 28.8   MCHC 33.0 - 36.0 g/dL 32.7 (L)   RDW 11.5 - 17.0 % 14.5   Platelet Count 130 - 400 x10(3)/mcL 188   MPV 7.4 - 10.4 fL 10.1   Neut % % 73.4   LYMPH % % 17.5   Mono % % 6.6   Eos % % 1.4   Basophil % % 0.8   Immature Granulocytes % 0.3   Neut # 2.1 - 9.2 x10(3)/mcL 4.69   Lymph # 0.6 - 4.6 x10(3)/mcL 1.12   Mono # 0.1 - 1.3 x10(3)/mcL 0.42   Eos # 0 - 0.9 x10(3)/mcL 0.09   Baso # <=0.2 x10(3)/mcL 0.05   Immature Grans (Abs) 0.00 - 0.04 x10(3)/mcL 0.02   nRBC % 0.0   Iron 50 - 170 ug/dL 50   TIBC 250 - 450 ug/dL 179 (L)   UIBC 70 - 310 ug/dL 129   Transferrin 180 - 382 mg/dL 161 (L)   Ferritin 4.63 - 204.00 ng/mL 1,665.06 (H)   Iron Saturation 20 - 50 % 28   Sodium 136 - 145 mmol/L 136   Potassium 3.5 - 5.1 mmol/L 3.7   Chloride 98 - 107 mmol/L 95 (L)   CO2 22 - 29 mmol/L 27   BUN 7.0 - 18.7 mg/dL 48.4 (H)   Creatinine 0.55 - 1.02 mg/dL 7.37 (H)   eGFR mL/min/1.73/m2 7   Glucose 74 - 100 mg/dL 210 (H)   Calcium 8.4 - 10.2 mg/dL 9.4   Phosphorus Level 2.3 - 4.7 mg/dL 5.4 (H)   Albumin 3.5 - 5.0 g/dL 3.2 (L)   (L): Data is abnormally low  (H): Data is abnormally high        X-Ray Chest AP Portable   Final Result       Borderline enlargement of the cardiac silhouette.  Otherwise, no acute cardiopulmonary disease.           Electronically signed by:Domingo Giang   Date:                                                07/24/2025   Time:                                                 15:44             Assessment / Plan:      ESRD on HD usual days TTS  Chest pain with history of CAD status post PCI-cardiology consulted  Hypertension  Anemia secondary to CKD  Type 1 diabetes  Hyperlipidemia  Vaginal candidiasis     Recommendations:   Patient is a receive hemodialysis today per usual TTS schedule.  CBC CMP in a.m..

## 2025-07-26 NOTE — PROGRESS NOTES
[facility]    Cardiology  Progress Note    Patient Name: Pamela Harris  MRN: [id number]  Admission Date: 7/24/2025  Hospital Length of Stay: 2 days  Code Status: Full Code   Attending Provider: Isela Foreman MD   Consulting Provider: NADEEM Harper  Primary Care Physician: Debbi, Primary Doctor  Principal Problem:<principal problem not specified>    Patient information was obtained from patient, past medical records, and ER records.     Subjective:   Chief Complaint/Reason for Consult: ACS r/o hx of CAD, chest pain    HPI: Mrs. Harris, is a 40-year-old female who is known to [facility], Dr. Serrano.  The patient presented to the ER on 7.24.25 with c/o chest pain.  The patient reported the chest pain started 1 hour into dialysis on yesterday.  She described the pain as stabbing and sharp.  The patient received aspirin and nitroglycerin en route with improvement in her symptoms.  The patient's last full run of dialysis was on Tuesday; dialysis days are on T, Th, and Sat.  ER w/u initial troponin was 13 with trend down, EKG showed NSR without ST or T-wave abnormality, hypokalemia and chest x-ray showed an enlarged cardiac silhouette.  Otherwise, no acute cardiopulmonary disease.  [facility] is being consulted for ACS r/o, hx of CAD and chest pain.    Hospital Course:  7.26.25: NAD Noted. Hypertensive. Echo reviewed with Intact EF. Denies CP/SOB. Reports Recent Strept Infection.    PMH: HTN, HLD, DM, ESRD on HD, Gallstones  PSH: LHC, Renal biopsy, insertion of tunneled catheter, LUE basillic vein transposition, TC exchange  Family History: Mother- DM, HTN, CVA, Sister- DM, HTN, CVA  Social History: Tobacco use- Negative, EtOH abuse_    Previous Cardiac Diagnostics:   Echo (7.25.25):  Left Ventricle: The left ventricle is normal in size. Normal wall thickness. There is normal systolic function with a visually estimated ejection fraction of 55 - 60%. There is normal diastolic function.  Right  "Ventricle: The right ventricle is normal in size. Systolic function is normal.  Mitral Valve: Mildly thickened leaflets. Mildly calcified leaflets.  IVC/SVC: Elevated venous pressure at 15 mmHg.    LHC (22):  PTCA and HAYLEY of proximal LAD IVUS LAD    Review of patient's allergies indicates:   Allergen Reactions    Clonidine Anaphylaxis     Patch- burned skin       No current facility-administered medications on file prior to encounter.     Current Outpatient Medications on File Prior to Encounter   Medication Sig    amLODIPine (NORVASC) 10 MG tablet Take 10 mg by mouth.    aspirin 81 MG Chew Take 81 mg by mouth every morning.    BASAGLAR KWIKPEN U-100 INSULIN glargine 100 units/mL SubQ pen Inject into the skin.    calcitRIOL (ROCALTROL) 0.25 MCG Cap Take 0.25 mcg by mouth.    furosemide (LASIX) 80 MG tablet Take 80 mg by mouth every morning.    insulin lispro 100 unit/mL pen SMARTSI Unit(s) SUB-Q 3 Times Daily    multivitamin (THERAGRAN) per tablet Take 1 tablet by mouth.    atorvastatin (LIPITOR) 80 MG tablet Take 80 mg by mouth.    BD ULTRA-FINE SHORT PEN NEEDLE 31 gauge x 5/16" Ndle Inject into the skin 2 (two) times daily.    carvediloL (COREG) 25 MG tablet Take 25 mg by mouth 2 (two) times daily.    ergocalciferol (ERGOCALCIFEROL) 50,000 unit Cap Take 50,000 Units by mouth.    insulin syringe-needle U-100 1 mL 31 gauge x 5/16 Syrg Inject 1 Syringe into the skin 4 (four) times daily.    insulin syringes, disposable, 1 mL Syrg For administering her lantus daily and humalog TID    irbesartan (AVAPRO) 150 MG tablet Take 150 mg by mouth.    loratadine (CLARITIN) 10 mg tablet Take 10 mg by mouth daily as needed.    medroxyPROGESTERone (DEPO-PROVERA) 150 mg/mL Syrg Inject into the muscle every 3 (three) months.    metoclopramide HCl (REGLAN) 10 MG tablet Take 10 mg by mouth.    NOVOLOG FLEXPEN U-100 INSULIN 100 unit/mL (3 mL) InPn pen SMARTSI Unit(s) SUB-Q 3 Times Daily    pantoprazole (PROTONIX) 40 MG " tablet Take 40 mg by mouth.    promethazine-dextromethorphan (PROMETHAZINE-DM) 6.25-15 mg/5 mL Syrp Take 5 mLs by mouth every 6 (six) hours as needed.    sevelamer carbonate (RENVELA) 800 mg Tab Take 1,600 mg by mouth 3 (three) times daily.    sodium bicarbonate 650 MG tablet Take 1,950 mg by mouth.    ticagrelor (BRILINTA) 90 mg tablet Take 90 mg by mouth.    traMADoL (ULTRAM) 50 mg tablet Take 50 mg by mouth every 6 (six) hours as needed.    traZODone (DESYREL) 50 MG tablet Take 50 mg by mouth nightly as needed.    TRUE METRIX GLUCOSE TEST STRIP Strp 3 (three) times daily.    TRUEPLUS LANCETS 33 gauge Misc Apply topically.    VELPHORO 500 mg Chew Take 2 tablets by mouth 3 (three) times daily.     Review of Systems   Respiratory:  Negative for chest tightness and shortness of breath.    Cardiovascular:  Negative for chest pain.   All other systems reviewed and are negative.    Objective:     Vital Signs (Most Recent):  Temp: 97.7 °F (36.5 °C) (07/26/25 0805)  Pulse: 63 (07/26/25 0805)  Resp: 18 (07/26/25 0805)  BP: (!) 160/67 (07/26/25 0805)  SpO2: (!) 93 % (07/26/25 0805) Vital Signs (24h Range):  Temp:  [97.7 °F (36.5 °C)-98.8 °F (37.1 °C)] 97.7 °F (36.5 °C)  Pulse:  [63-70] 63  Resp:  [16-18] 18  SpO2:  [93 %-98 %] 93 %  BP: (143-174)/(58-78) 160/67   Weight: 77.9 kg (171 lb 12.8 oz)  Body mass index is 28.59 kg/m².  SpO2: (!) 93 %       Intake/Output Summary (Last 24 hours) at 7/26/2025 0917  Last data filed at 7/25/2025 2214  Gross per 24 hour   Intake 360 ml   Output 1 ml   Net 359 ml     Lines/Drains/Airways       Peripheral Intravenous Line  Duration             Peripheral IV Single Lumen 07/24/25 1612 20 G Right Antecubital 1 day                  Significant Labs:   Chemistries:   Recent Labs   Lab 07/24/25  1606 07/25/25  0402 07/25/25  2153 07/26/25  0211    137  --  136   K 2.9* 3.6  --  3.7   CL 93* 95*  --  95*   CO2 32* 26  --  27   BUN 22.1* 29.8*  --  48.4*   CREATININE 4.29* 5.77*  --  7.37*    CALCIUM 9.2 9.0  --  9.4   PROT 8.7* 7.8  --   --    BILITOT 1.0 0.9  --   --    ALKPHOS 213* 184*  --   --    ALT 13 12  --   --    AST 15 15  --   --    MG 2.00 2.20 2.30  --    PHOS  --   --   --  5.4*        CBC/Anemia Labs: Coags:    Recent Labs   Lab 07/24/25  1606 07/25/25  0549 07/26/25  0211   WBC 6.08 5.40 6.39   HGB 11.1* 11.2* 9.9*   HCT 34.1* 34.9* 30.3*    207 188   MCV 89.7 88.8 88.1   RDW 14.7 14.7 14.5   IRON  --   --  50   FERRITIN  --   --  1,665.06*    Recent Labs   Lab 07/24/25  1606   INR 1.1        Significant Imaging:  Imaging Results              X-Ray Chest AP Portable (Final result)  Result time 07/24/25 15:44:39      Final result by Domingo Giang MD (07/24/25 15:44:39)                   Impression:      Borderline enlargement of the cardiac silhouette.  Otherwise, no acute cardiopulmonary disease.      Electronically signed by: Domingo Giang  Date:    07/24/2025  Time:    15:44               Narrative:    EXAMINATION:  XR CHEST AP PORTABLE    CLINICAL HISTORY:  Chest pain, unspecified    TECHNIQUE:  Single frontal view of the chest was performed.    COMPARISON:  None    FINDINGS:  Trachea is midline.  Borderline enlargement of the cardiac silhouette.  No evidence of pleural effusion, focal consolidation, or pneumothorax.  Osseous structures are intact.                                    Telemetry:  SR    Physical Exam  Vitals reviewed.   HENT:      Head: Normocephalic.   Cardiovascular:      Rate and Rhythm: Normal rate and regular rhythm.   Pulmonary:      Effort: Pulmonary effort is normal.   Musculoskeletal:         General: Normal range of motion.   Skin:     General: Skin is warm.   Neurological:      General: No focal deficit present.      Mental Status: She is alert and oriented to person, place, and time.   Psychiatric:         Mood and Affect: Mood normal.       Home Medications:   Medications Ordered Prior to Encounter[1]  Current Schedule Inpatient Medications:    aspirin  81 mg Oral QAM    atorvastatin  80 mg Oral Daily    calcitRIOL  0.25 mcg Oral Daily    carvediloL  25 mg Oral BID    cefTRIAXone (Rocephin) IV (PEDS and ADULTS)  1 g Intravenous Q24H    enoxparin  30 mg Subcutaneous Daily    ergocalciferol  50,000 Units Oral Q7 Days    furosemide  80 mg Oral QAM    insulin glargine U-100 (Lantus)  18 Units Subcutaneous Daily    losartan  100 mg Oral Daily    miconazole   Topical (Top) BID    NIFEdipine  60 mg Oral Daily    pantoprazole  40 mg Oral Daily    sevelamer carbonate  1,600 mg Oral TID    sodium bicarbonate  650 mg Oral Daily    ticagrelor  90 mg Oral BID         Assessment:   Chest Pain (Resolved)    - Troponin Values Normal    - EF 55-60% (Echo 7.25.25)  CAD    - Status Post PTCA and HAYLEY of proximal LAD IVUS LAD  HTN (Uncontrolled)  HLD  DM (uncontrolled)    - Hgb A1c 10.1  ESRD on HD  Anemia  Gallstones    Plan:   Continue Current Medical Management  Optimize Antihypertensive agents- If needed, increase Procardia XL to 90 Mg Daily. Can also transition from Losartan to Diovan.  Follow up with Dr. Serrano Outpatient where stress testing can be considered.   Will be available. Call if needed.    Manan Smith, NADEEM  Cardiology  [facility]         [1]   No current facility-administered medications on file prior to encounter.     Current Outpatient Medications on File Prior to Encounter   Medication Sig Dispense Refill    amLODIPine (NORVASC) 10 MG tablet Take 10 mg by mouth.      aspirin 81 MG Chew Take 81 mg by mouth every morning.      BASAGLAR KWIKPEN U-100 INSULIN glargine 100 units/mL SubQ pen Inject into the skin.      calcitRIOL (ROCALTROL) 0.25 MCG Cap Take 0.25 mcg by mouth.      furosemide (LASIX) 80 MG tablet Take 80 mg by mouth every morning.      insulin lispro 100 unit/mL pen SMARTSI Unit(s) SUB-Q 3 Times Daily      multivitamin (THERAGRAN) per tablet Take 1 tablet by mouth.      atorvastatin (LIPITOR) 80 MG tablet  "Take 80 mg by mouth.      BD ULTRA-FINE SHORT PEN NEEDLE 31 gauge x 5/16" Ndle Inject into the skin 2 (two) times daily.      carvediloL (COREG) 25 MG tablet Take 25 mg by mouth 2 (two) times daily.      ergocalciferol (ERGOCALCIFEROL) 50,000 unit Cap Take 50,000 Units by mouth.      insulin syringe-needle U-100 1 mL 31 gauge x 5/16 Syrg Inject 1 Syringe into the skin 4 (four) times daily.      insulin syringes, disposable, 1 mL Syrg For administering her lantus daily and humalog TID      irbesartan (AVAPRO) 150 MG tablet Take 150 mg by mouth.      loratadine (CLARITIN) 10 mg tablet Take 10 mg by mouth daily as needed.      medroxyPROGESTERone (DEPO-PROVERA) 150 mg/mL Syrg Inject into the muscle every 3 (three) months.      metoclopramide HCl (REGLAN) 10 MG tablet Take 10 mg by mouth.      NOVOLOG FLEXPEN U-100 INSULIN 100 unit/mL (3 mL) InPn pen SMARTSI Unit(s) SUB-Q 3 Times Daily      pantoprazole (PROTONIX) 40 MG tablet Take 40 mg by mouth.      promethazine-dextromethorphan (PROMETHAZINE-DM) 6.25-15 mg/5 mL Syrp Take 5 mLs by mouth every 6 (six) hours as needed.      sevelamer carbonate (RENVELA) 800 mg Tab Take 1,600 mg by mouth 3 (three) times daily.      sodium bicarbonate 650 MG tablet Take 1,950 mg by mouth.      ticagrelor (BRILINTA) 90 mg tablet Take 90 mg by mouth.      traMADoL (ULTRAM) 50 mg tablet Take 50 mg by mouth every 6 (six) hours as needed.      traZODone (DESYREL) 50 MG tablet Take 50 mg by mouth nightly as needed.      TRUE METRIX GLUCOSE TEST STRIP Strp 3 (three) times daily.      TRUEPLUS LANCETS 33 gauge Misc Apply topically.      VELPHORO 500 mg Chew Take 2 tablets by mouth 3 (three) times daily.       "

## 2025-07-26 NOTE — DISCHARGE SUMMARY
[facility] Medicine Discharge Summary    Admit Date: 7/24/2025  Discharge Date and Time: 7/26/20257:06 PM  Admitting Physician: JOSE Team  Discharging Physician: Isela Foreman MD.  Primary Care Physician: Debbi, Primary Doctor      Discharge Diagnoses:    Atypical chest pain-rule out ACS  Poorly-controlled HTN/hypertensive urgency  Strep throat  History of CAD status post PCI  HLD   Type 2 diabetes mellitus-poorly controlled with A1c more than 10  History of cholelithiasis   ESRD on HD-TTS schedule   History of GERD  Prolonged QT    Hospital Course:     40-year-old  female with significant history of CAD status post PCI on dual antiplatelets, HTN, HLD, type 2 diabetes mellitus, cholelithiasis, ESRD on HD-TTS schedule presented to the ED with complaints of chest pain which started while she was at dialysis.  Chest pain is stabbing/sharp.  Patient was significantly hypotensive in the ED. lab significant for severe hypokalemia, ESRD, troponins negative.  Chest x-ray with cardiomegaly, no active pulmonary disease.  Hospital medicine services consulted for admission and cardiology services consulted to further evaluate chest pain .  Troponins negative, echo non impressive, chest pain improved, avoiding QT prolonging agents, Mag within normal limits, resumed home medications including aspirin and Brilinta, high-intensity statin, patient also on Coreg and losartan, ceftriaxone for strep throat as the patient requested to be on IV medications instead of p.o. medication, blood pressure poorly controlled after resuming home Coreg, losartan and amlodipine and therefore amlodipine was switch to nifedipine.  Nephrology on board to continue hemodialysis while in-house, diabetes also poorly controlled, on sliding scale, Lantus dosing was adjusted, other home medications also resumed as appropriate, patient was re-evaluated on 07/26, dialyzed on 07/26, cardiology cleared the  patient for DC, no more having chest pain, medications adjusted again since BP was not at goal, after dialysis and medication modification BP improved to goal, patient was discharged home in stable condition on p.o. antibiotics for strep throat, treatment plans discussed with the patient and she voiced understanding to everything explained, patient will follow up with primary cardiologist, PCP and Nephrology  Vitals:  VITAL SIGNS: 24 HRS MIN & MAX LAST   Temp  Min: 97.7 °F (36.5 °C)  Max: 98.8 °F (37.1 °C) 98.4 °F (36.9 °C)   BP  Min: 152/69  Max: 174/78 (!) 152/69   Pulse  Min: 58  Max: 70  60   Resp  Min: 17  Max: 20 20   SpO2  Min: 92 %  Max: 98 % (!) 92 %       Physical Exam:  General appearance:  No acute distress  HENT: Atraumatic   Lungs: Clear to auscultation bilaterally.   Heart: RRR,No edema  Abdomen: Soft, non tender   Extremities: warm  Neuro:  Awake, alert, oriented x4  Psych/mental status: Appropriate mood and affect.      Procedures Performed: No admission procedures for hospital encounter.     Significant Diagnostic Studies: See Full reports for all details    Recent Labs   Lab 07/24/25  1606 07/25/25  0549 07/26/25  0211   WBC 6.08 5.40 6.39   RBC 3.80* 3.93* 3.44*   HGB 11.1* 11.2* 9.9*   HCT 34.1* 34.9* 30.3*   MCV 89.7 88.8 88.1   MCH 29.2 28.5 28.8   MCHC 32.6* 32.1* 32.7*   RDW 14.7 14.7 14.5    207 188   MPV 10.4 9.9 10.1       Recent Labs   Lab 07/24/25  1606 07/25/25  0402 07/25/25  2153 07/26/25  0211    137  --  136   K 2.9* 3.6  --  3.7   CL 93* 95*  --  95*   CO2 32* 26  --  27   BUN 22.1* 29.8*  --  48.4*   CREATININE 4.29* 5.77*  --  7.37*   * 174*  --  210*   CALCIUM 9.2 9.0  --  9.4   MG 2.00 2.20 2.30  --    ALBUMIN 3.6 3.3*  --  3.2*   PROT 8.7* 7.8  --   --    ALKPHOS 213* 184*  --   --    ALT 13 12  --   --    AST 15 15  --   --    BILITOT 1.0 0.9  --   --         Microbiology Results (last 7 days)       ** No results found for the last 168 hours. **          "    Echo    Left Ventricle: The left ventricle is normal in size. Normal wall   thickness. There is normal systolic function with a visually estimated   ejection fraction of 55 - 60%. There is normal diastolic function.    Right Ventricle: The right ventricle is normal in size. Systolic   function is normal.    Mitral Valve: Mildly thickened leaflets. Mildly calcified leaflets.    IVC/SVC: Elevated venous pressure at 15 mmHg.         Medication List        START taking these medications      amoxicillin 500 MG Tab  Commonly known as: AMOXIL  Take 1 tablet (500 mg total) by mouth once daily. for 7 days     NIFEdipine 90 MG (OSM) 24 hr tablet  Commonly known as: PROCARDIA-XL  Take 1 tablet (90 mg total) by mouth once daily.  Start taking on: July 27, 2025            CHANGE how you take these medications      BASAGLAR KWIKPEN U-100 INSULIN 100 unit/mL (3 mL) Inpn pen  Generic drug: insulin glargine U-100 (Lantus)  Inject 20 Units into the skin once daily.  What changed:   how much to take  when to take this            CONTINUE taking these medications      aspirin 81 MG Chew     atorvastatin 80 MG tablet  Commonly known as: LIPITOR     BD ULTRA-FINE SHORT PEN NEEDLE 31 gauge x 5/16" Ndle  Generic drug: pen needle, diabetic     BRILINTA 90 mg tablet  Generic drug: ticagrelor     calcitRIOL 0.25 MCG Cap  Commonly known as: ROCALTROL     carvediloL 25 MG tablet  Commonly known as: COREG     ergocalciferol 50,000 unit Cap  Commonly known as: ERGOCALCIFEROL     furosemide 80 MG tablet  Commonly known as: LASIX     * insulin syringes (disposable) 1 mL Syrg     * insulin syringe-needle U-100 1 mL 31 gauge x 5/16 Syrg     irbesartan 150 MG tablet  Commonly known as: AVAPRO     loratadine 10 mg tablet  Commonly known as: CLARITIN     medroxyPROGESTERone 150 mg/mL Syrg  Commonly known as: DEPO-PROVERA     multivitamin per tablet  Commonly known as: THERAGRAN     NovoLOG Flexpen U-100 Insulin 100 unit/mL (3 mL) Inpn pen  Generic " drug: insulin aspart U-100     pantoprazole 40 MG tablet  Commonly known as: PROTONIX     sevelamer carbonate 800 mg Tab  Commonly known as: RENVELA     sodium bicarbonate 650 MG tablet     traZODone 50 MG tablet  Commonly known as: DESYREL     TRUE METRIX GLUCOSE TEST STRIP Strp  Generic drug: blood sugar diagnostic     TRUEPLUS LANCETS 33 gauge Misc  Generic drug: lancets           * This list has 2 medication(s) that are the same as other medications prescribed for you. Read the directions carefully, and ask your doctor or other care provider to review them with you.                STOP taking these medications      amLODIPine 10 MG tablet  Commonly known as: NORVASC     insulin lispro 100 unit/mL pen     metoclopramide HCl 10 MG tablet  Commonly known as: REGLAN     promethazine-dextromethorphan 6.25-15 mg/5 mL Syrp  Commonly known as: PROMETHAZINE-DM     traMADoL 50 mg tablet  Commonly known as: ULTRAM     VELPHORO 500 mg Chew  Generic drug: sucroferric oxyhydroxide               Where to Get Your Medications        These medications were sent to [id number] - [place] [address] AT [facility] & [place] [address], [facility] [address]      Phone: [phone]   amoxicillin 500 MG Tab  BASAGLAR KWIKPEN U-100 INSULIN 100 unit/mL (3 mL) Inpn pen  NIFEdipine 90 MG (OSM) 24 hr tablet          Explained in detail to the patient about the discharge plan, medications, and follow-up visits. Pt understands and agrees with the treatment plan  Discharge Disposition:     Discharged Condition: stable  Diet-   Dietary Orders (From admission, onward)       Start     Ordered    07/24/25 2245  Diet Renal On Dialysis Standard Tray  Diet effective now        Comments: DIABETIC   Question:  Tray type:  Answer:  Standard Tray    07/24/25 2244                   Medications Per DC med rec  Activities as tolerated   Follow-up Information       Brian Polanco MD  Follow up in 1 week(s).    Specialty: Internal Medicine  Why: will call with date and time  Contact information:  [address] [place] [address]  [phone]               Lita Yoon NP Follow up.    Specialty: Family Medicine  Why: will call with date and time  Contact information:  [address] [facility] [address]  [phone]                           For further questions contact hospitalist office    Discharge time 33 minutes    For worsening symptoms, chest pain, shortness of breath, increased abdominal pain, high grade fever, stroke or stroke like symptoms, immediately go to the nearest Emergency Room or call 911 as soon as possible.      Isela Jenkins M.D on 7/26/2025. at 7:06 PM.

## 2025-07-27 NOTE — NURSING
07/26/25 1950   Post-Hemodialysis Assessment   Blood Volume Processed (Liters) 71.1 L   Dialyzer Clearance Heavily streaked   Duration of Treatment 180 minutes   Total UF (mL) 2000 mL   Patient Response to Treatment Tolerated well   Post-Hemodialysis Comments Tx completed as ordered, no issues or concerns throughout tx.

## 2025-07-27 NOTE — NURSING
Patient plan to dc. Patient just need BP recheck, Patient BP 1939 156/71. Previous nurse spoke to MD ok to let patient go. Dc instructions given. Patient educated on new med and being called for follow up appointments. Verbalized understanding. Patient. IV dc'd. Patient said she will call her ride and get dressed. Said she will take night meds when she gets home. Patient with no no needs. Call light in reach. Waiting for ride homw

## 2025-07-28 ENCOUNTER — PATIENT OUTREACH (OUTPATIENT)
Dept: ADMINISTRATIVE | Facility: CLINIC | Age: 40
End: 2025-07-28
Payer: COMMERCIAL

## 2025-07-28 ENCOUNTER — TELEPHONE (OUTPATIENT)
Dept: ADMINISTRATIVE | Facility: CLINIC | Age: 40
End: 2025-07-28
Payer: COMMERCIAL

## 2025-07-28 NOTE — PROGRESS NOTES
2nd attempt. C3 nurse attempted to reach the patient for a follow up. No number on file, no other contacts listed. no pcp.

## 2025-07-28 NOTE — TELEPHONE ENCOUNTER
"Phoned patient in response to reply of "2" to post-discharge texting tracker. The person answering states we have been messaging the wrong number. She states, "I am not Pamela and this is not her number." She asked that her number be removed from our messaging system. Number was removed.            "